# Patient Record
Sex: FEMALE | Race: BLACK OR AFRICAN AMERICAN | Employment: FULL TIME | URBAN - METROPOLITAN AREA
[De-identification: names, ages, dates, MRNs, and addresses within clinical notes are randomized per-mention and may not be internally consistent; named-entity substitution may affect disease eponyms.]

---

## 2017-02-16 ENCOUNTER — OFFICE VISIT (OUTPATIENT)
Dept: FAMILY MEDICINE | Facility: CLINIC | Age: 47
End: 2017-02-16
Payer: COMMERCIAL

## 2017-02-16 VITALS
HEART RATE: 68 BPM | WEIGHT: 163 LBS | DIASTOLIC BLOOD PRESSURE: 58 MMHG | SYSTOLIC BLOOD PRESSURE: 100 MMHG | TEMPERATURE: 98.2 F | RESPIRATION RATE: 16 BRPM | BODY MASS INDEX: 32 KG/M2 | HEIGHT: 60 IN

## 2017-02-16 DIAGNOSIS — E55.9 VITAMIN D DEFICIENCY: ICD-10-CM

## 2017-02-16 DIAGNOSIS — N92.1 METRORRHAGIA: Primary | ICD-10-CM

## 2017-02-16 DIAGNOSIS — R53.82 CHRONIC FATIGUE: ICD-10-CM

## 2017-02-16 DIAGNOSIS — R53.83 FATIGUE, UNSPECIFIED TYPE: ICD-10-CM

## 2017-02-16 DIAGNOSIS — R79.89 LOW TSH LEVEL: ICD-10-CM

## 2017-02-16 LAB
BETA HCG QUAL IFA URINE: NEGATIVE
HGB BLD-MCNC: 13.1 G/DL (ref 11.7–15.7)

## 2017-02-16 PROCEDURE — 80048 BASIC METABOLIC PNL TOTAL CA: CPT | Performed by: PHYSICIAN ASSISTANT

## 2017-02-16 PROCEDURE — 85018 HEMOGLOBIN: CPT | Performed by: PHYSICIAN ASSISTANT

## 2017-02-16 PROCEDURE — 36415 COLL VENOUS BLD VENIPUNCTURE: CPT | Performed by: PHYSICIAN ASSISTANT

## 2017-02-16 PROCEDURE — 84443 ASSAY THYROID STIM HORMONE: CPT | Performed by: PHYSICIAN ASSISTANT

## 2017-02-16 PROCEDURE — 82306 VITAMIN D 25 HYDROXY: CPT | Performed by: PHYSICIAN ASSISTANT

## 2017-02-16 PROCEDURE — 84703 CHORIONIC GONADOTROPIN ASSAY: CPT | Performed by: PHYSICIAN ASSISTANT

## 2017-02-16 PROCEDURE — 99213 OFFICE O/P EST LOW 20 MIN: CPT | Performed by: PHYSICIAN ASSISTANT

## 2017-02-16 RX ORDER — LANOLIN ALCOHOL/MO/W.PET/CERES
1000 CREAM (GRAM) TOPICAL DAILY
COMMUNITY
End: 2018-01-16

## 2017-02-16 RX ORDER — MULTIPLE VITAMINS W/ MINERALS TAB 9MG-400MCG
1 TAB ORAL DAILY
COMMUNITY

## 2017-02-16 NOTE — PROGRESS NOTES
SUBJECTIVE:                                                    Taya Baltazar is a 46 year old female who presents to clinic today for the following health issues:      Concern - fatigue and rule out pregnancy     Onset: 1-2 months    Description:   Works night shift-    Intensity: moderate    Progression of Symptoms:  same    Accompanying Signs & Symptoms:  LMP - 12/25/16 -states had spotting on 2/14/17. Is sexually active does not think she is pregnant       Previous history of similar problem:   yes    Precipitating factors:   Worsened by:     Alleviating factors:  Improved by:        Therapies Tried and outcome:     Patient is here to talk about ongoing fatigue. This has been present for years.   She works alternating night shifts and sleeps about 3 hours and then another 3 hours later, very interrupted sleep. She has 3 children she is caring for when not at work. She had labs done last spring that noted vitamin D deficiency. TSH was low, with normal T4. She just started taking vit d 500 IU last week. Taking vit B complex.   She denies symptoms of depression and states she is very happy.   Does not have trouble sleeping. Denies other symptoms such as pain, headache, vision changes.   She would like a pregnancy test run, just in case.   -------------------------------------    Problem list and histories reviewed & adjusted, as indicated.  Additional history: as documented    Patient Active Problem List   Diagnosis     CARDIOVASCULAR SCREENING; LDL GOAL LESS THAN 160     Vitamin D deficiency     Trigger finger     Chronic low back pain     Indication for care in labor or delivery     Pregnant     Carpal tunnel syndrome     Aphthous ulcer     Xerosis of skin     Pain in joint involving pelvic region and thigh, unspecified laterality     Chronic fatigue     Low TSH level     Past Surgical History   Procedure Laterality Date     No history of surgery         Social History   Substance Use Topics     Smoking status:  Never Smoker     Smokeless tobacco: Never Used     Alcohol use No     Family History   Problem Relation Age of Onset     Unknown/Adopted Father      NOT adopted, but he  young     Asthma No family hx of      C.A.D. No family hx of      DIABETES No family hx of      Hypertension No family hx of      Breast Cancer No family hx of      Cancer - colorectal No family hx of      Anesthesia Reaction No family hx of      Blood Disease No family hx of      Eye Disorder No family hx of      Thyroid Disease No family hx of          Current Outpatient Prescriptions   Medication Sig Dispense Refill     multivitamin, therapeutic with minerals (MULTI-VITAMIN) TABS tablet Take 1 tablet by mouth daily       VITAMIN D, CHOLECALCIFEROL, PO Take 5,000 Units by mouth daily       cyanocobalamin (VITAMIN  B-12) 1000 MCG tablet Take 1,000 mcg by mouth daily       Prenatal Vit-Fe Fumarate-FA (PRENATAL ONE DAILY PO) Take  by mouth.       Allergies   Allergen Reactions     Ibuprofen Sodium Nausea and Vomiting     Vicodin [Hydrocodone-Acetaminophen]      Labs reviewed in EPIC  Problem list, Medication list, Allergies, and Medical/Social/Surgical histories reviewed in [facility] and updated as appropriate.    Social History     Social History     Marital status:      Spouse name: N/A     Number of children: N/A     Years of education: N/A     Social History Main Topics     Smoking status: Never Smoker     Smokeless tobacco: Never Used     Alcohol use No     Drug use: No     Sexual activity: Yes     Partners: Male     Other Topics Concern     Blood Transfusions No     Seat Belt Yes     Social History Narrative       10 point review of systems negative other than symptoms noted above.   Constitutional, HEENT, CV, pulmonary, GI, , MS, Endo, Psych systems are all negative, except as otherwise noted.       OBJECTIVE:  /58 (BP Location: Right arm, Patient Position: Chair, Cuff Size: Adult Regular)  Pulse 68  Temp 98.2  F (36.8  C)  Resp  16  Ht 5' (1.524 m)  Wt 163 lb (73.9 kg)  LMP 12/25/2016 (Approximate)  BMI 31.83 kg/m2  CONSTITUTIONAL: Alert, well-nourished, well-groomed, NAD  RESP: Lungs CTA. No wheeze, rhonchi, rales. Normal effort on room air. Equal lung sounds bilaterally.   CV: HRRR, normal S1, S2. No MRG. No peripheral edema.  DERM: No rashes or suspicious lesions  PSYCH: Alert and oriented. Thought process is clear and goal-directed. Adequate insight and judgement. Mood - normal. Affect - normal.  Neck: No lymphadenopathy or masses. Thyroid smooth, non-tender, and non-enlarged.      Diagnostic Tests:  Results for orders placed or performed in visit on 02/16/17   Beta HCG qual IFA urine   Result Value Ref Range    Beta HCG Qual IFA Urine Negative NEG   Hemoglobin   Result Value Ref Range    Hemoglobin 13.1 11.7 - 15.7 g/dL         ASSESSMENT/PLAN:  (N92.1) Metrorrhagia  (primary encounter diagnosis)  Comment: negative.   Plan: Beta HCG qual IFA urine            (R53.83) Fatigue, unspecified type  Comment: Will recheck labs per patient request, but I suspect this is due to her poor sleep schedule. She says there is no way around this as she needs to work and take care of her kids. Continue new vit D and will recheck levels - could consider 50,000 IU weekly. Increase water intake.   Plan: Basic metabolic panel  (Ca, Cl, CO2, Creat,         Gluc, K, Na, BUN), Vitamin D Deficiency, TSH         with free T4 reflex, Hemoglobin            (E55.9) Vitamin D deficiency  Comment:   Plan: Await labs.     (R53.82) Chronic fatigue  Comment:   Plan: As above.     (R94.6) Low TSH level  Comment:   Plan: recheck labs.     Declines flu shot.   FOLLOW-UP: Await labs. PRN.   The patient agrees with this assessment and plan and agrees to call or return to the clinic with any questions or concerns or if their condition worsens.    BAR Cruz, PAYokoC  [facility]

## 2017-02-16 NOTE — NURSING NOTE
Chief Complaint   Patient presents with     Fatigue       Initial /58 (BP Location: Right arm, Patient Position: Chair, Cuff Size: Adult Regular)  Pulse 68  Temp 98.2  F (36.8  C)  Resp 16  Ht 5' (1.524 m)  Wt 163 lb (73.9 kg)  LMP 12/25/2016 (Approximate)  BMI 31.83 kg/m2 Estimated body mass index is 31.83 kg/(m^2) as calculated from the following:    Height as of this encounter: 5' (1.524 m).    Weight as of this encounter: 163 lb (73.9 kg).  Medication Reconciliation: complete. CARMEN Grider LPN

## 2017-02-16 NOTE — LETTER
[address] [place] [address]   [phone]      February 17, 2017    Taya Baltazar  [id number] [facility] [place] [address]            Dear Ms. Baltazar          Vitamin d still low - sent over prescription for vit d 50,000 IU WEEKLY for 8 weeks then switch to other prescription for 2,000 IU daily.   Thyroid normal, all other labs normal.     BAR Pandya, PA-C

## 2017-02-16 NOTE — MR AVS SNAPSHOT
After Visit Summary   2/16/2017    Taya Baltazar    MRN: [id number]           Patient Information     Date Of Birth          1970        Visit Information        Provider Department      2/16/2017 11:15 AM Kavita Biswas PA-C; [employer] [facility] [place]        Today's Diagnoses     Metrorrhagia    -  1    Fatigue, unspecified type        Vitamin D deficiency        Chronic fatigue        Low TSH level           Follow-ups after your visit        Follow-up notes from your care team     Return if symptoms worsen or fail to improve.      Who to contact     If you have questions or need follow up information about today's clinic visit or your schedule please contact [facility] directly at [phone].  Normal or non-critical lab and imaging results will be communicated to you by [employer], letter or phone within 4 business days after the clinic has received the results. If you do not hear from us within 7 days, please contact the clinic through [employer] or phone. If you have a critical or abnormal lab result, we will notify you by phone as soon as possible.  Submit refill requests through [employer] or call your pharmacy and they will forward the refill request to us. Please allow 3 business days for your refill to be completed.          Additional Information About Your Visit        MyChart Information     [employer] gives you secure access to your electronic health record. If you see a primary care provider, you can also send messages to your care team and make appointments. If you have questions, please call your primary care clinic.  If you do not have a primary care provider, please call [phone] and they will assist you.        Care EveryWhere ID     This is your Care EveryWhere ID. This could be used by other organizations to access your [place] medical records  [id number]        Your Vitals Were     Pulse Temperature Respirations  Height Last Period BMI (Body Mass Index)    68 98.2  F (36.8  C) 16 5' (1.524 m) 12/25/2016 (Approximate) 31.83 kg/m2       Blood Pressure from Last 3 Encounters:   02/16/17 100/58   02/26/16 120/82   11/12/14 106/69    Weight from Last 3 Encounters:   02/16/17 163 lb (73.9 kg)   02/26/16 152 lb (68.9 kg)   11/12/14 164 lb (74.4 kg)              We Performed the Following     Basic metabolic panel  (Ca, Cl, CO2, Creat, Gluc, K, Na, BUN)     Beta HCG qual IFA urine     Hemoglobin     TSH with free T4 reflex     Vitamin D Deficiency        Primary Care Provider Office Phone # Fax #    Kavita Biswas PA-C [phone]       [facility] [address] [place] [address] [address]  [place] [address]        Thank you!     Thank you for choosing [facility]  for your [place]. Our goal is always to provide you with excellent care. Hearing back from our patients is one way we can continue to improve our services. Please take a few minutes to complete the written survey that you may receive in the mail after your visit with us. Thank you!             Your Updated Medication List - Protect others around you: Learn how to safely use, store and throw away your medicines at www.disposemymeds.org.          This list is accurate as of: 2/16/17 12:46 PM.  Always use your most recent med list.                   Brand Name Dispense Instructions for use    cyanocobalamin 1000 MCG tablet    vitamin  B-12     Take 1,000 mcg by mouth daily       Multi-vitamin Tabs tablet      Take 1 tablet by mouth daily       PRENATAL ONE DAILY PO      Take  by mouth.       VITAMIN D (CHOLECALCIFEROL) PO      Take 5,000 Units by mouth daily

## 2017-02-17 LAB
ANION GAP SERPL CALCULATED.3IONS-SCNC: 12 MMOL/L (ref 3–14)
BUN SERPL-MCNC: 14 MG/DL (ref 7–30)
CALCIUM SERPL-MCNC: 8.9 MG/DL (ref 8.5–10.1)
CHLORIDE SERPL-SCNC: 103 MMOL/L (ref 94–109)
CO2 SERPL-SCNC: 25 MMOL/L (ref 20–32)
CREAT SERPL-MCNC: 0.46 MG/DL (ref 0.52–1.04)
DEPRECATED CALCIDIOL+CALCIFEROL SERPL-MC: 29 UG/L (ref 20–75)
GFR SERPL CREATININE-BSD FRML MDRD: ABNORMAL ML/MIN/1.7M2
GLUCOSE SERPL-MCNC: 102 MG/DL (ref 70–99)
POTASSIUM SERPL-SCNC: 4.1 MMOL/L (ref 3.4–5.3)
SODIUM SERPL-SCNC: 140 MMOL/L (ref 133–144)
TSH SERPL DL<=0.005 MIU/L-ACNC: 0.53 MU/L (ref 0.4–4)

## 2017-02-17 RX ORDER — CHOLECALCIFEROL (VITAMIN D3) 50 MCG
2000 TABLET ORAL DAILY
Qty: 90 TABLET | Refills: 3 | Status: SHIPPED | OUTPATIENT
Start: 2017-02-17 | End: 2018-01-16

## 2017-10-01 ENCOUNTER — HEALTH MAINTENANCE LETTER (OUTPATIENT)
Age: 47
End: 2017-10-01

## 2017-11-14 ENCOUNTER — OFFICE VISIT (OUTPATIENT)
Dept: FAMILY MEDICINE | Facility: CLINIC | Age: 47
End: 2017-11-14
Payer: COMMERCIAL

## 2017-11-14 VITALS
HEART RATE: 76 BPM | SYSTOLIC BLOOD PRESSURE: 90 MMHG | BODY MASS INDEX: 31.83 KG/M2 | DIASTOLIC BLOOD PRESSURE: 54 MMHG | TEMPERATURE: 98.7 F | WEIGHT: 163 LBS

## 2017-11-14 DIAGNOSIS — L85.3 DRY SKIN DERMATITIS: ICD-10-CM

## 2017-11-14 DIAGNOSIS — H57.9 ITCH OF EYE: Primary | ICD-10-CM

## 2017-11-14 PROCEDURE — 99213 OFFICE O/P EST LOW 20 MIN: CPT | Performed by: FAMILY MEDICINE

## 2017-11-14 RX ORDER — DESONIDE 0.5 MG/G
OINTMENT TOPICAL
Qty: 15 G | Refills: 0 | Status: SHIPPED | OUTPATIENT
Start: 2017-11-14 | End: 2018-01-16

## 2017-11-14 NOTE — PROGRESS NOTES
SUBJECTIVE:   Taya Baltazar is a 47 year old female who presents to clinic today for the following health issues:      Eye(s) Problem  Onset: 3 days - worsening  Patient noticed some dryness on the upper part of the eyelid on the skin area. She denies any blurry vision no redness no watering of eyes or any other symptoms. She also have similar symptom at the base of her chin on the skin.    Description:   Location: right  Pain: YES  Redness: YES    Accompanying Signs & Symptoms:  Discharge/mattering: no   Swelling: no  Visual changes: no   Fever: no   Nasal Congestion: no   Bothered by bright lights: no       Therapies Tried and outcome: NONE          Problem list and histories reviewed & adjusted, as indicated.  Additional history: as documented    Patient Active Problem List   Diagnosis     CARDIOVASCULAR SCREENING; LDL GOAL LESS THAN 160     Vitamin D deficiency     Trigger finger     Chronic low back pain     Indication for care in labor or delivery     Pregnant     Carpal tunnel syndrome     Aphthous ulcer     Xerosis of skin     Pain in joint involving pelvic region and thigh, unspecified laterality     Chronic fatigue     Low TSH level     Past Surgical History:   Procedure Laterality Date     NO HISTORY OF SURGERY         Social History   Substance Use Topics     Smoking status: Never Smoker     Smokeless tobacco: Never Used     Alcohol use No     Family History   Problem Relation Age of Onset     Unknown/Adopted Father      NOT adopted, but he  young     Asthma No family hx of      C.A.D. No family hx of      DIABETES No family hx of      Hypertension No family hx of      Breast Cancer No family hx of      Cancer - colorectal No family hx of      Anesthesia Reaction No family hx of      Blood Disease No family hx of      Eye Disorder No family hx of      Thyroid Disease No family hx of          Current Outpatient Prescriptions   Medication Sig Dispense Refill     desonide (DESOWEN) 0.05 % ointment  Apply sparingly to affected area 2 times daily as needed. 15 g 0     multivitamin, therapeutic with minerals (MULTI-VITAMIN) TABS tablet Take 1 tablet by mouth daily       VITAMIN D, CHOLECALCIFEROL, PO Take 5,000 Units by mouth daily       cyanocobalamin (VITAMIN  B-12) 1000 MCG tablet Take 1,000 mcg by mouth daily       cholecalciferol (VITAMIN D3) 49915 UNITS capsule Take 1 capsule (50,000 Units) by mouth once a week (Patient not taking: Reported on 11/14/2017) 8 capsule 0     Cholecalciferol (VITAMIN D) 2000 UNITS tablet Take 2,000 Units by mouth daily (Patient not taking: Reported on 11/14/2017) 90 tablet 3     Prenatal Vit-Fe Fumarate-FA (PRENATAL ONE DAILY PO) Take  by mouth.           Reviewed and updated as needed this visit by clinical staffTobacco  Allergies  Meds       Reviewed and updated as needed this visit by Provider         ROS:  C: NEGATIVE for fever, chills, change in weight  ROS otherwise negative    OBJECTIVE:                                                    BP 90/54  Pulse 76  Temp 98.7  F (37.1  C) (Tympanic)  Wt 163 lb (73.9 kg)  LMP 10/20/2017  BMI 31.83 kg/m2  Body mass index is 31.83 kg/(m^2).   GENERAL: healthy, alert, well nourished, well hydrated, no distress  Patient skin on the eyelid is slightly dry ends raised no infection appreciated. On the chin is slightly dry and erythematous      ASSESSMENT/PLAN:                                                        ICD-10-CM    1. Itch of eye skin H57.8 desonide (DESOWEN) 0.05 % ointment   2. Dry skin dermatitis L85.3      Patient has skin irritation possible eczema on the eyelid area along with chin. I would suggest low-dose of steroid cream to be applied 2 times a day for the next 1 week. Follow up if symptoms are not getting better.    Alfredo Belcher MD  [facility]

## 2017-11-14 NOTE — NURSING NOTE
Chief Complaint   Patient presents with     Eye Problem     right        Initial BP 90/54  Pulse 76  Temp 98.7  F (37.1  C) (Tympanic)  Wt 163 lb (73.9 kg)  LMP 10/20/2017  BMI 31.83 kg/m2 Estimated body mass index is 31.83 kg/(m^2) as calculated from the following:    Height as of 2/16/17: 5' (1.524 m).    Weight as of this encounter: 163 lb (73.9 kg).  Medication Reconciliation: complete    Current Outpatient Prescriptions   Medication Sig Dispense Refill     multivitamin, therapeutic with minerals (MULTI-VITAMIN) TABS tablet Take 1 tablet by mouth daily       VITAMIN D, CHOLECALCIFEROL, PO Take 5,000 Units by mouth daily       cyanocobalamin (VITAMIN  B-12) 1000 MCG tablet Take 1,000 mcg by mouth daily       cholecalciferol (VITAMIN D3) 69792 UNITS capsule Take 1 capsule (50,000 Units) by mouth once a week (Patient not taking: Reported on 11/14/2017) 8 capsule 0     Cholecalciferol (VITAMIN D) 2000 UNITS tablet Take 2,000 Units by mouth daily (Patient not taking: Reported on 11/14/2017) 90 tablet 3     Prenatal Vit-Fe Fumarate-FA (PRENATAL ONE DAILY PO) Take  by mouth.         Steven JORGE CMA

## 2017-11-14 NOTE — MR AVS SNAPSHOT
After Visit Summary   11/14/2017    Taya Baltazar    MRN: [id number]           Patient Information     Date Of Birth          1970        Visit Information        Provider Department      11/14/2017 1:00 PM Alfredo Belcher MD; [employer] [facility] [place]        Today's Diagnoses     Itch of eye skin    -  1    Dry skin dermatitis           Follow-ups after your visit        Who to contact     If you have questions or need follow up information about today's clinic visit or your schedule please contact [facility] [place] directly at [phone].  Normal or non-critical lab and imaging results will be communicated to you by [employer], letter or phone within 4 business days after the clinic has received the results. If you do not hear from us within 7 days, please contact the clinic through [employer] or phone. If you have a critical or abnormal lab result, we will notify you by phone as soon as possible.  Submit refill requests through [employer] or call your pharmacy and they will forward the refill request to us. Please allow 3 business days for your refill to be completed.          Additional Information About Your Visit        MyChart Information     [employer] gives you secure access to your electronic health record. If you see a primary care provider, you can also send messages to your care team and make appointments. If you have questions, please call your primary care clinic.  If you do not have a primary care provider, please call [phone] and they will assist you.        Care EveryWhere ID     This is your Care EveryWhere ID. This could be used by other organizations to access your [place] medical records  [id number]        Your Vitals Were     Pulse Temperature Last Period BMI (Body Mass Index)          76 98.7  F (37.1  C) (Tympanic) 10/20/2017 31.83 kg/m2         Blood Pressure from Last 3 Encounters:   11/14/17 90/54   02/16/17 100/58   02/26/16  120/82    Weight from Last 3 Encounters:   11/14/17 163 lb (73.9 kg)   02/16/17 163 lb (73.9 kg)   02/26/16 152 lb (68.9 kg)              Today, you had the following     No orders found for display         Today's Medication Changes          These changes are accurate as of: 11/14/17  1:38 PM.  If you have any questions, ask your nurse or doctor.               Start taking these medicines.        Dose/Directions    desonide 0.05 % ointment   Commonly known as:  DESOWEN   Used for:  Itch of eye   Started by:  Alfredo Belcher MD        Apply sparingly to affected area 2 times daily as needed.   Quantity:  15 g   Refills:  0            Where to get your medicines      These medications were sent to [id number] - [place], [place] - [address] AT [facility] [place] [address]  [address], [place] [address]    Hours:  24-hours Phone:  [phone]     desonide 0.05 % ointment                Primary Care Provider Office Phone # Fax #    Kavita Biswas PA-C [phone]       [address]  [place] [address]        Equal Access to Services     LEILANI QUIROZ AH: Hadii sherif ku hadasho Soomaali, waaxda luqadaha, qaybta kaalmada adeegyada, waxay jocelynnin haysilverio herr. So [facility] [phone].    ATENCIÓN: Si habla español, tiene a quiros disposición servicios gratuitos de asistencia lingüística. Llame al [phone].    We comply with applicable federal civil rights laws and [place] laws. We do not discriminate on the basis of race, color, national origin, age, disability, sex, sexual orientation, or gender identity.            Thank you!     Thank you for choosing [facility] [place]  for your care. Our goal is always to provide you with excellent care. Hearing back from our patients is one way we can continue to improve our services. Please take a few minutes to complete the written survey that you may receive in the mail after your visit with  us. Thank you!             Your Updated Medication List - Protect others around you: Learn how to safely use, store and throw away your medicines at www.disposemymeds.org.          This list is accurate as of: 11/14/17  1:38 PM.  Always use your most recent med list.                   Brand Name Dispense Instructions for use Diagnosis    cyanocobalamin 1000 MCG tablet    vitamin  B-12     Take 1,000 mcg by mouth daily        desonide 0.05 % ointment    DESOWEN    15 g    Apply sparingly to affected area 2 times daily as needed.    Itch of eye       Multi-vitamin Tabs tablet      Take 1 tablet by mouth daily        PRENATAL ONE DAILY PO      Take  by mouth.        * VITAMIN D (CHOLECALCIFEROL) PO      Take 5,000 Units by mouth daily        * cholecalciferol 58875 UNITS capsule    VITAMIN D3    8 capsule    Take 1 capsule (50,000 Units) by mouth once a week    Vitamin D deficiency       * vitamin D 2000 UNITS tablet     90 tablet    Take 2,000 Units by mouth daily    Vitamin D deficiency       * Notice:  This list has 3 medication(s) that are the same as other medications prescribed for you. Read the directions carefully, and ask your doctor or other care provider to review them with you.

## 2018-01-16 ENCOUNTER — OFFICE VISIT (OUTPATIENT)
Dept: FAMILY MEDICINE | Facility: CLINIC | Age: 48
End: 2018-01-16
Payer: COMMERCIAL

## 2018-01-16 VITALS
DIASTOLIC BLOOD PRESSURE: 62 MMHG | WEIGHT: 161 LBS | HEIGHT: 60 IN | SYSTOLIC BLOOD PRESSURE: 90 MMHG | TEMPERATURE: 97.6 F | HEART RATE: 72 BPM | OXYGEN SATURATION: 98 % | BODY MASS INDEX: 31.61 KG/M2

## 2018-01-16 DIAGNOSIS — H65.00 ACUTE SEROUS OTITIS MEDIA, RECURRENCE NOT SPECIFIED, UNSPECIFIED LATERALITY: ICD-10-CM

## 2018-01-16 DIAGNOSIS — J30.0 CHRONIC VASOMOTOR RHINITIS: Primary | ICD-10-CM

## 2018-01-16 PROCEDURE — 99213 OFFICE O/P EST LOW 20 MIN: CPT | Performed by: FAMILY MEDICINE

## 2018-01-16 RX ORDER — FLUTICASONE PROPIONATE 50 MCG
2 SPRAY, SUSPENSION (ML) NASAL DAILY
Qty: 1 BOTTLE | Refills: 3 | Status: SHIPPED | OUTPATIENT
Start: 2018-01-16 | End: 2021-12-15

## 2018-01-16 RX ORDER — CETIRIZINE HYDROCHLORIDE 10 MG/1
10 TABLET ORAL EVERY EVENING
Qty: 90 TABLET | Refills: 1 | Status: SHIPPED | OUTPATIENT
Start: 2018-01-16 | End: 2018-01-24

## 2018-01-16 NOTE — MR AVS SNAPSHOT
After Visit Summary   1/16/2018    Taya Baltazar    MRN: [id number]           Patient Information     Date Of Birth          1970        Visit Information        Provider Department      1/16/2018 1:15 PM Robert Simpson MD; [facility] [employer] [facility] [place]        Today's Diagnoses     Chronic vasomotor rhinitis    -  1    Acute serous otitis media, recurrence not specified, unspecified laterality           Follow-ups after your visit        Follow-up notes from your care team     Return in about 1 month (around 2/16/2018) for Annual Physical Exam.      Who to contact     If you have questions or need follow up information about today's clinic visit or your schedule please [place] [facility] [place] at [phone].  Normal or non-critical lab and imaging results will be communicated to you by MyChart, letter or phone within 4 business days after the clinic has received the results. If you do not hear from us within 7 days, please contact the clinic through MyChart or phone. If you have a critical or abnormal lab result, we will notify you by phone as soon as possible.  Submit refill requests through [employer] or call your pharmacy and they will forward the refill request to us. Please allow 3 business days for your refill to be completed.          Additional Information About Your Visit        MyChart Information     Worldrat gives you secure access to your electronic health record. If you see a primary care provider, you can also send messages to your care team and make appointments. If you have questions, please call your primary care clinic.  If you do not have a primary care provider, please call [phone] and they will assist you.        Care EveryWhere ID     This is your Care EveryWhere ID. This could be used by other organizations to access your [facility] medical records  [id number]        Your Vitals Were     Pulse Temperature Height Pulse  Oximetry BMI (Body Mass Index)       72 97.6  F (36.4  C) (Tympanic) 5' (1.524 m) 98% 31.44 kg/m2        Blood Pressure from Last 3 Encounters:   01/16/18 90/62   11/14/17 90/54   02/16/17 100/58    Weight from Last 3 Encounters:   01/16/18 161 lb (73 kg)   11/14/17 163 lb (73.9 kg)   02/16/17 163 lb (73.9 kg)              Today, you had the following     No orders found for display         Today's Medication Changes          These changes are accurate as of: 1/16/18  1:48 PM.  If you have any questions, ask your nurse or doctor.               Start taking these medicines.        Dose/Directions    cetirizine 10 MG tablet   Commonly known as:  zyrTEC   Used for:  Chronic vasomotor rhinitis, Acute serous otitis media, recurrence not specified, unspecified laterality   Started by:  Robert Simpson MD        Dose:  10 mg   Take 1 tablet (10 mg) by mouth every evening   Quantity:  90 tablet   Refills:  1       fluticasone 50 MCG/ACT spray   Commonly known as:  FLONASE   Used for:  Chronic vasomotor rhinitis   Started by:  Robert Simpson MD        Dose:  2 spray   Spray 2 sprays into both nostrils daily   Quantity:  1 Bottle   Refills:  3         Stop taking these medicines if you haven't already. Please contact your care team if you have questions.     cholecalciferol 08159 UNITS capsule   Commonly known as:  VITAMIN D3   Stopped by:  Robert Simpson MD           cyanocobalamin 1000 MCG tablet   Commonly known as:  vitamin  B-12   Stopped by:  Robert Simpson MD           desonide 0.05 % ointment   Commonly known as:  DESOWEN   Stopped by:  Robert Simpson MD           PRENATAL ONE DAILY PO   Stopped by:  Robert Simpson MD           VITAMIN D (CHOLECALCIFEROL) PO   Stopped by:  Robert Simpson MD           vitamin D 2000 UNITS tablet   Stopped by:  Robert Simpson MD                Where to get your medicines      These medications were sent to [email or web address]  [place], [place] - [address] AT [facility] [place] [address]  [address] [place] [address]     Phone:  [phone]     cetirizine 10 MG tablet    fluticasone 50 MCG/ACT spray                Primary Care Provider Office Phone # Fax #    Robert Simpson [phone]       [address]  [place] [address]        Equal Access to Services     [facility]: Hadii aad ku hadasho Soomaali, waaxda luqadaha, qaybta kaalmada adeegyada, waxay idiin hayaan adeeg kharash la'aan ah. So [facility] [phone].    ATENCIÓN: Si alberto griffiths, tiene a quiros disposición servicios gratuitos de asistencia lingüística. Llame al [phone].    We comply with applicable federal civil rights laws and [place] laws. We do not discriminate on the basis of race, color, national origin, age, disability, sex, sexual orientation, or gender identity.            Thank you!     [facility] you [facility]. Our goal is always to provide you with excellent care. Hearing back from our patients is one way we can continue to improve our services. Please take a few minutes to complete the written survey that you may receive in the mail after your visit with us. Thank you!             Your Updated Medication List - Protect others around you: Learn how to safely use, store and throw away your medicines at www.disposemymeds.org.          This list is accurate as of: 1/16/18  1:48 PM.  Always use your most recent med list.                   Brand Name Dispense Instructions for use Diagnosis    cetirizine 10 MG tablet    zyrTEC    90 tablet    Take 1 tablet (10 mg) by mouth every evening    Chronic vasomotor rhinitis, Acute serous otitis media, recurrence not specified, unspecified laterality       fluticasone 50 MCG/ACT spray    FLONASE    1 Bottle    Spray 2 sprays into both nostrils daily    Chronic vasomotor rhinitis       Multi-vitamin Tabs tablet      Take 1 tablet by mouth daily

## 2018-01-16 NOTE — NURSING NOTE
Chief Complaint   Patient presents with     Nasal Congestion       Initial BP 90/62  Pulse 72  Temp 97.6  F (36.4  C) (Tympanic)  Ht 5' (1.524 m)  Wt 161 lb (73 kg)  SpO2 98%  BMI 31.44 kg/m2 Estimated body mass index is 31.44 kg/(m^2) as calculated from the following:    Height as of this encounter: 5' (1.524 m).    Weight as of this encounter: 161 lb (73 kg).  Medication Reconciliation: complete

## 2018-01-16 NOTE — PROGRESS NOTES
SUBJECTIVE:   Taya Baltazar is a 47 year old female who presents to clinic today for the following health issues:      Acute Illness   Acute illness concerns: nasal congestion  - mucus gets stuck in throat  Onset: 3-4  months    Fever: no     Chills/Sweats: no    Headache (location?): no     Sinus Pressure:no    Conjunctivitis:  no    Ear Pain: YES: both    Rhinorrhea: no     Congestion: YES    Sore Throat: no      Cough: no    Wheeze: no    Decreased Appetite: no    Nausea: no    Vomiting: no    Diarrhea:  no    Dysuria/Freq.: no    Fatigue/Achiness: no    Sick/Strep Exposure: no     Therapies Tried and outcome: no        Problem list and histories reviewed & adjusted, as indicated.  Additional history: as documented    Patient Active Problem List   Diagnosis     CARDIOVASCULAR SCREENING; LDL GOAL LESS THAN 160     Vitamin D deficiency     Trigger finger     Chronic low back pain     Indication for care in labor or delivery     Pregnant     Carpal tunnel syndrome     Aphthous ulcer     Xerosis of skin     Pain in joint involving pelvic region and thigh, unspecified laterality     Chronic fatigue     Low TSH level     Past Surgical History:   Procedure Laterality Date     NO HISTORY OF SURGERY         Social History   Substance Use Topics     Smoking status: Never Smoker     Smokeless tobacco: Never Used     Alcohol use No     Family History   Problem Relation Age of Onset     Unknown/Adopted Father      NOT adopted, but he  young     Asthma No family hx of      C.A.D. No family hx of      DIABETES No family hx of      Hypertension No family hx of      Breast Cancer No family hx of      Cancer - colorectal No family hx of      Anesthesia Reaction No family hx of      Blood Disease No family hx of      Eye Disorder No family hx of      Thyroid Disease No family hx of          Current Outpatient Prescriptions   Medication Sig Dispense Refill     fluticasone (FLONASE) 50 MCG/ACT spray Spray 2 sprays into both  nostrils daily 1 Bottle 3     cetirizine (ZYRTEC) 10 MG tablet Take 1 tablet (10 mg) by mouth every evening 90 tablet 1     multivitamin, therapeutic with minerals (MULTI-VITAMIN) TABS tablet Take 1 tablet by mouth daily       Allergies   Allergen Reactions     Ibuprofen Sodium Nausea and Vomiting     Vicodin [Hydrocodone-Acetaminophen]          Reviewed and updated as needed this visit by clinical staffTobacco  Allergies  Meds       Reviewed and updated as needed this visit by Provider         ROS:  C: NEGATIVE for fever, chills, change in weight  GI: NEGATIVE for nausea, abdominal pain, heartburn, or change in bowel habits    OBJECTIVE:                                                    BP 90/62  Pulse 72  Temp 97.6  F (36.4  C) (Tympanic)  Ht 5' (1.524 m)  Wt 161 lb (73 kg)  SpO2 98%  BMI 31.44 kg/m2  Body mass index is 31.44 kg/(m^2).   GENERAL: healthy, alert, well nourished, well hydrated, no distress  HENT: ear canals- normal; TMs-bilateral serous fluid noted.  Nose-bilateral swollen turbinates.  Clear discharge.  No sinus tenderness bilaterally.; Mouth- no ulcers, no lesions  NECK: no tenderness, no adenopathy, no asymmetry, no masses, no stiffness; thyroid- normal to palpation  RESP: lungs clear to auscultation - no rales, no rhonchi, no wheezes  CV: regular rates and rhythm, normal S1 S2, no S3 or S4 and no murmur, no click or rub -  ABDOMEN: soft, no tenderness, no  hepatosplenomegaly, no masses, normal bowel sounds         ASSESSMENT/PLAN:                                                        ICD-10-CM    1. Chronic vasomotor rhinitis J30.0 fluticasone (FLONASE) 50 MCG/ACT spray     cetirizine (ZYRTEC) 10 MG tablet   2. Acute serous otitis media, recurrence not specified, unspecified laterality H65.00 cetirizine (ZYRTEC) 10 MG tablet     Signs of bacterial infection.  Recommended to start using Flonase every morning and Zyrtec every evening.  Increase hydration.  No antibiotics indicated.  I do  not see signs of viral infection either.  Symptoms are likely allergic in nature.  If symptoms do not improve in the next few weeks, instructed to follow-up.  Recommended an annual examination.      Robert Simpson MD  [facility] [place]

## 2018-01-24 ENCOUNTER — OFFICE VISIT (OUTPATIENT)
Dept: FAMILY MEDICINE | Facility: CLINIC | Age: 48
End: 2018-01-24
Payer: COMMERCIAL

## 2018-01-24 VITALS
OXYGEN SATURATION: 98 % | HEIGHT: 60 IN | WEIGHT: 166 LBS | SYSTOLIC BLOOD PRESSURE: 98 MMHG | DIASTOLIC BLOOD PRESSURE: 68 MMHG | HEART RATE: 76 BPM | TEMPERATURE: 98.1 F | BODY MASS INDEX: 32.59 KG/M2

## 2018-01-24 DIAGNOSIS — Z00.00 ROUTINE GENERAL MEDICAL EXAMINATION AT A HEALTH CARE FACILITY: Primary | ICD-10-CM

## 2018-01-24 DIAGNOSIS — Z12.31 ENCOUNTER FOR SCREENING MAMMOGRAM FOR BREAST CANCER: ICD-10-CM

## 2018-01-24 DIAGNOSIS — E55.9 VITAMIN D DEFICIENCY: ICD-10-CM

## 2018-01-24 DIAGNOSIS — J30.0 CHRONIC VASOMOTOR RHINITIS: ICD-10-CM

## 2018-01-24 PROBLEM — R53.82 CHRONIC FATIGUE: Status: RESOLVED | Noted: 2017-02-16 | Resolved: 2018-01-24

## 2018-01-24 LAB — HGB BLD-MCNC: 12.7 G/DL (ref 11.7–15.7)

## 2018-01-24 PROCEDURE — 36415 COLL VENOUS BLD VENIPUNCTURE: CPT | Performed by: FAMILY MEDICINE

## 2018-01-24 PROCEDURE — 85018 HEMOGLOBIN: CPT | Performed by: FAMILY MEDICINE

## 2018-01-24 PROCEDURE — 80053 COMPREHEN METABOLIC PANEL: CPT | Performed by: FAMILY MEDICINE

## 2018-01-24 PROCEDURE — 80061 LIPID PANEL: CPT | Performed by: FAMILY MEDICINE

## 2018-01-24 PROCEDURE — 82306 VITAMIN D 25 HYDROXY: CPT | Performed by: FAMILY MEDICINE

## 2018-01-24 PROCEDURE — G0145 SCR C/V CYTO,THINLAYER,RESCR: HCPCS | Performed by: FAMILY MEDICINE

## 2018-01-24 PROCEDURE — 87624 HPV HI-RISK TYP POOLED RSLT: CPT | Performed by: FAMILY MEDICINE

## 2018-01-24 PROCEDURE — 99396 PREV VISIT EST AGE 40-64: CPT | Performed by: FAMILY MEDICINE

## 2018-01-24 PROCEDURE — 84443 ASSAY THYROID STIM HORMONE: CPT | Performed by: FAMILY MEDICINE

## 2018-01-24 NOTE — MR AVS SNAPSHOT
After Visit Summary   1/24/2018    Taya Baltazar    MRN: [id number]           Patient Information     Date Of Birth          1970        Visit Information        Provider Department      1/24/2018 10:15 AM Robert Simpson MD; [facility] [employer] [facility] [place]        Today's Diagnoses     Routine general medical examination at a health care facility    -  1    Chronic vasomotor rhinitis        Encounter for screening mammogram for breast cancer          Care Instructions      Preventive Health Recommendations  Female Ages 40 to 49    Yearly exam:     See your health care provider every year in order to  1. Review health changes.   2. Discuss preventive care.    3. Review your medicines if your doctor prescribed any.      Get a Pap test every three years (unless you have an abnormal result and your provider advises testing more often).      If you get Pap tests with HPV test, you only need to test every 5 years, unless you have an abnormal result. You do not need a Pap test if your uterus was removed (hysterectomy) and you have not had cancer.      You should be tested each year for STDs (sexually transmitted diseases), if you're at risk.       Ask your doctor if you should have a mammogram.      Have a colonoscopy (test for colon cancer) if someone in your family has had colon cancer or polyps before age 50.       Have a cholesterol test every 5 years.       Have a diabetes test (fasting glucose) after age 45. If you are at risk for diabetes, you should have this test every 3 years.    Shots: Get a flu shot each year. Get a tetanus shot every 10 years.     Nutrition:     Eat at least 5 servings of fruits and vegetables each day.    Eat whole-grain bread, whole-wheat pasta and brown rice instead of white grains and rice.    Talk to your provider about Calcium and Vitamin D.     Lifestyle    Exercise at least 150 minutes a week (an average of 30 minutes a day, 5 days a week).  "This will help you control your weight and prevent disease.    Limit alcohol to one drink per day.    No smoking.     Wear sunscreen to prevent skin cancer.    See your dentist every six months for an exam and cleaning.          Follow-ups after your visit        Follow-up notes from your care team     Return in about 1 year (around 2019) for Annual Physical Exam.      Future tests that were ordered for you today     Open Future Orders        Priority Expected Expires Ordered    *MA Screening Digital Bilateral Routine  2019            Who to contact     If you have questions or need follow up information about today's clinic visit or your schedule please contact [facility] [place] directly at [phone].  Normal or non-critical lab and imaging results will be communicated to you by MyChart, letter or phone within 4 business days after the clinic has received the results. If you do not hear from us within 7 days, please contact the clinic through [employer] or phone. If you have a critical or abnormal lab result, we will notify you by phone as soon as possible.  Submit refill requests through [employer] Seen [employer] or call your pharmacy and they will forward the refill request to us. Please allow 3 business days for your refill to be completed.          Additional Information About Your Visit        [employer]oon Information     As Seen on TV lets you send messages to your doctor, view your test results, renew your prescriptions, schedule appointments and more. To sign up, go to [email or web address] . Click on \"Log in\" on the left side of the screen, which will take you to the Welcome page. Then click on \"Sign up Now\" on the right side of the page.     You will be asked to enter the access code listed below, as well as some personal information. Please follow the directions to create your username and password.     Your access code is: [email or web address]  Expires: 2018 10:48 AM     Your access code will  " in 90 days. If you need help or a new code, please call your [facility] clinic or [phone].        Care EveryWhere ID     This is your Care EveryWhere ID. This could be used by other organizations to access your [facility] medical records  [id number]        Your Vitals Were     Pulse Temperature Height Last Period Pulse Oximetry BMI (Body Mass Index)    76 98.1  F (36.7  C) (Tympanic) 5' (1.524 m) 01/07/2018 98% 32.42 kg/m2       Blood Pressure from Last 3 Encounters:   01/24/18 98/68   01/16/18 90/62   11/14/17 90/54    Weight from Last 3 Encounters:   01/24/18 166 lb (75.3 kg)   01/16/18 161 lb (73 kg)   11/14/17 163 lb (73.9 kg)              We Performed the Following     Comprehensive metabolic panel     Hemoglobin     HPV High Risk Types DNA Cervical     Lipid Profile     Pap imaged thin layer screen with HPV - recommended age 30 - 65     TSH with free T4 reflex     Vitamin D Deficiency          Today's Medication Changes          These changes are accurate as of 1/24/18 10:48 AM.  If you have any questions, ask your nurse or doctor.               Stop taking these medicines if you haven't already. Please contact your care team if you have questions.     cetirizine 10 MG tablet   Commonly known as:  zyrTEC   Stopped by:  Robert Simpson MD                    Primary Care Provider Office Phone # Fax #    Robert Simpson [phone]       [address]  [place] [address]        Equal Access to Services     [facility]: Hadii aad ku hadasho Soomaali, waaxda luqadaha, qaybta kaalmada akashegyada, jerrod herr. So [facility] [phone].    ATENCIÓN: Si habla español, tiene a quiros disposición servicios gratuitos de asistencia lingüística. Llame al [phone].    We comply with applicable federal civil rights laws and [place] laws. We do not discriminate on the basis of race, color, national origin, age, disability, sex, sexual orientation, or gender identity.             Thank you!     Thank you for choosing [facility] [place]  for your care. Our goal is always to provide you with excellent care. Hearing back from our patients is one way we can continue to improve our services. Please take a few minutes to complete the written survey that you may receive in the mail after your visit with us. Thank you!             Your Updated Medication List - Protect others around you: Learn how to safely use, store and throw away your medicines at www.disposemymeds.org.          This list is accurate as of 1/24/18 10:48 AM.  Always use your most recent med list.                   Brand Name Dispense Instructions for use Diagnosis    fluticasone 50 MCG/ACT spray    FLONASE    1 Bottle    Spray 2 sprays into both nostrils daily    Chronic vasomotor rhinitis       Multi-vitamin Tabs tablet      Take 1 tablet by mouth daily

## 2018-01-24 NOTE — LETTER
February 4, 2018    Taya Baltazar  [id number] [place] [address]    Dear Taya,  We are happy to inform you that your PAP smear result from 1/24/18 is normal.  We are now able to do a follow up test on PAP smears. The DNA test is for HPV (Human Papilloma Virus). Cervical cancer is closely linked with certain types of HPV. Your result showed no evidence of high risk HPV.  Therefore we recommend you return in 3 years for your next pap smear.  You will still need to return to the clinic every year for an annual exam and other preventive tests.  Please contact the clinic at [phone] with any questions.  Sincerely,    Robert Simpson MD/chelsey

## 2018-01-24 NOTE — NURSING NOTE
Chief Complaint   Patient presents with     Physical     fasting       Initial BP 98/68  Pulse 76  Temp 98.1  F (36.7  C) (Tympanic)  Ht 5' (1.524 m)  Wt 166 lb (75.3 kg)  LMP 01/07/2018  SpO2 98%  BMI 32.42 kg/m2 Estimated body mass index is 32.42 kg/(m^2) as calculated from the following:    Height as of this encounter: 5' (1.524 m).    Weight as of this encounter: 166 lb (75.3 kg).  Medication Reconciliation: complete

## 2018-01-24 NOTE — PROGRESS NOTES
SUBJECTIVE:   CC: Taya Baltazar is an 47 year old woman who presents for preventive health visit.     Healthy Habits:    Do you get at least three servings of calcium containing foods daily (dairy, green leafy vegetables, etc.)? yes    Amount of exercise or daily activities, outside of work: 0 day(s) per week    Problems taking medications regularly No    Medication side effects: No    Have you had an eye exam in the past two years? yes    Do you see a dentist twice per year? yes    Do you have sleep apnea, excessive snoring or daytime drowsiness?no      Needs a refill on her allergy medications.    Today's PHQ-2 Score:   PHQ-2 (  [employer]) 2018   Q1: Little interest or pleasure in doing things 0 0   Q2: Feeling down, depressed or hopeless 0 0   PHQ-2 Score 0 0       Abuse: Current or Past(Physical, Sexual or Emotional)- No  Do you feel safe in your environment - Yes    Social History   Substance Use Topics     Smoking status: Never Smoker     Smokeless tobacco: Never Used     Alcohol use No     If you drink alcohol do you typically have >3 drinks per day or >7 drinks per week? No                     Reviewed orders with patient.  Reviewed health maintenance and updated orders accordingly - Yes  Labs reviewed in EPIC  BP Readings from Last 3 Encounters:   18 98/68   18 90/62   17 90/54    Wt Readings from Last 3 Encounters:   18 166 lb (75.3 kg)   18 161 lb (73 kg)   17 163 lb (73.9 kg)                  Patient Active Problem List   Diagnosis     Vitamin D deficiency     Low TSH level     Past Surgical History:   Procedure Laterality Date     NO HISTORY OF SURGERY         Social History   Substance Use Topics     Smoking status: Never Smoker     Smokeless tobacco: Never Used     Alcohol use No     Family History   Problem Relation Age of Onset     Unknown/Adopted Father      NOT adopted, but he  young     Asthma No family hx of      C.A.D. No family hx of       DIABETES No family hx of      Hypertension No family hx of      Breast Cancer No family hx of      Cancer - colorectal No family hx of      Anesthesia Reaction No family hx of      Blood Disease No family hx of      Eye Disorder No family hx of      Thyroid Disease No family hx of          Current Outpatient Prescriptions   Medication Sig Dispense Refill     fluticasone (FLONASE) 50 MCG/ACT spray Spray 2 sprays into both nostrils daily 1 Bottle 3     multivitamin, therapeutic with minerals (MULTI-VITAMIN) TABS tablet Take 1 tablet by mouth daily       Allergies   Allergen Reactions     Ibuprofen Sodium Nausea and Vomiting     Vicodin [Hydrocodone-Acetaminophen]        Patient under age 50, mutual decision reflected in health maintenance.      Pertinent mammograms are reviewed under the imaging tab.  History of abnormal Pap smear: NO - age 30- 65 PAP every 3 years recommended    Reviewed and updated as needed this visit by clinical staff  Tobacco  Allergies  Meds  Problems         Reviewed and updated as needed this visit by Provider  Allergies  Problems            ROS:  C: NEGATIVE for fever, chills, change in weight  I: NEGATIVE for worrisome rashes, moles or lesions  E: NEGATIVE for vision changes or irritation  ENT: NEGATIVE for ear, mouth and throat problems  R: NEGATIVE for significant cough or SOB  B: NEGATIVE for masses, tenderness or discharge  CV: NEGATIVE for chest pain, palpitations or peripheral edema  GI: NEGATIVE for nausea, abdominal pain, heartburn, or change in bowel habits  : NEGATIVE for unusual urinary or vaginal symptoms. Periods are regular.  M: NEGATIVE for significant arthralgias or myalgia  N: NEGATIVE for weakness, dizziness or paresthesias  P: NEGATIVE for changes in mood or affect    OBJECTIVE:   BP 98/68  Pulse 76  Temp 98.1  F (36.7  C) (Tympanic)  Ht 5' (1.524 m)  Wt 166 lb (75.3 kg)  LMP 01/07/2018  SpO2 98%  BMI 32.42 kg/m2  EXAM:  GENERAL: healthy, alert and no  distress  EYES: Eyes grossly normal to inspection, PERRL and conjunctivae and sclerae normal  HENT: ear canals and TM's normal, nose and mouth without ulcers or lesions  NECK: no adenopathy, no asymmetry, masses, or scars and thyroid normal to palpation  RESP: lungs clear to auscultation - no rales, rhonchi or wheezes  BREAST: normal without masses, tenderness or nipple discharge and no palpable axillary masses or adenopathy  CV: regular rate and rhythm, normal S1 S2, no S3 or S4, no murmur, click or rub, no peripheral edema and peripheral pulses strong  ABDOMEN: soft, nontender, no hepatosplenomegaly, no masses and bowel sounds normal   (female): normal female external genitalia, normal urethral meatus, vaginal mucosa pink, moist, well rugated, and normal cervix/adnexa/uterus without masses or discharge  MS: no gross musculoskeletal defects noted, no edema  SKIN: no suspicious lesions or rashes  NEURO: Normal strength and tone, mentation intact and speech normal  PSYCH: mentation appears normal, affect normal/bright    ASSESSMENT/PLAN:   1. Routine general medical examination at a health care facility  Screening Pap smear and fasting labs ordered.  - Pap imaged thin layer screen with HPV - recommended age 30 - 65  - HPV High Risk Types DNA Cervical  - TSH with free T4 reflex  - Hemoglobin  - Comprehensive metabolic panel  - Lipid Profile  - Vitamin D Deficiency    2. Chronic vasomotor rhinitis  Refill on Flonase was ordered earlier this month.  Patient notified    3. Encounter for screening mammogram for breast cancer  Screening mammogram ordered  - *MA Screening Digital Bilateral; Future    COUNSELING:   Reviewed preventive health counseling, as reflected in patient instructions       Regular exercise       Healthy diet/nutrition         reports that she has never smoked. She has never used smokeless tobacco.    Estimated body mass index is 32.42 kg/(m^2) as calculated from the following:    Height as of this  encounter: 5' (1.524 m).    Weight as of this encounter: 166 lb (75.3 kg).   Weight management plan: Discussed healthy diet and exercise guidelines and patient will follow up in 12 months in clinic to re-evaluate.    Counseling Resources:  ATP IV Guidelines  Pooled Cohorts Equation Calculator  Breast Cancer Risk Calculator  FRAX Risk Assessment  ICSI Preventive Guidelines  Dietary Guidelines for Americans, 2010  USDA's MyPlate  ASA Prophylaxis  Lung CA Screening    Robert Simpson MD  [facility] [place]

## 2018-01-24 NOTE — LETTER
January 26, 2018      Taya Baltazar  [id number] [facility] [place] [address]        Dear ,    I have reviewed your recent labs. Here are the results:    -Liver and gallbladder tests are normal. (ALT,AST, Alk phos, bilirubin), kidney function is normal (Cr, GFR), Sodium is normal, Potassium is normal, Calcium is normal, Glucose is normal (diabetes screening test).     -Your LDL cholesterol is flagged as high. However, this is a cut off only for people with chronic health conditions like diabetes, heart disease. For other individuals the goals for LDL cholesterol are higher, so I am happy with where your LDL is.    ADVICE: The mediterranean diet has been shown to reduce LDL cholesterol and increase HDL cholesterol and has also been shown to reduce the risk of cardiovascular disease, cancer, and Alzheimer's dementia. This diet focuses on healthy fats (monounsatruated and polyunsaturated) such that are found in fish, extra virgin olive oil, nuts, and avocados. It is also high in fruits & vegetables (7 servings per day) and whole grains. Regular exercise is also important in reducing cholesterol. Repeat cholesterol panel should be checked in 1year again.    -TSH (thyroid stimulating hormone) level is normal which indicates normal thyroid function.  -Hemoglobin is normal.  There is no evidence of anemia.    -Vitamin D level is low and oral supplementation should be started.  ADVISE: starting over the counter Vitamin D3  2000 IU daily.  I have ordered the medication to the pharmacy.    Results for orders placed or performed in visit on 01/24/18   TSH with free T4 reflex   Result Value Ref Range    TSH 0.57 0.40 - 4.00 mU/L   Hemoglobin   Result Value Ref Range    Hemoglobin 12.7 11.7 - 15.7 g/dL   Comprehensive metabolic panel   Result Value Ref Range    Sodium 136 133 - 144 mmol/L    Potassium 3.8 3.4 - 5.3 mmol/L    Chloride 104 94 - 109 mmol/L    Carbon Dioxide 22 20 - 32 mmol/L    Anion Gap 10 3 - 14 mmol/L     Glucose 93 70 - 99 mg/dL    Urea Nitrogen 11 7 - 30 mg/dL    Creatinine 0.38 (L) 0.52 - 1.04 mg/dL    GFR Estimate >90 >60 mL/min/1.7m2    GFR Estimate If Black >90 >60 mL/min/1.7m2    Calcium 8.6 8.5 - 10.1 mg/dL    Bilirubin Total 0.3 0.2 - 1.3 mg/dL    Albumin 3.8 3.4 - 5.0 g/dL    Protein Total 7.6 6.8 - 8.8 g/dL    Alkaline Phosphatase 60 40 - 150 U/L    ALT 15 0 - 50 U/L    AST 18 0 - 45 U/L   Lipid Profile   Result Value Ref Range    Cholesterol 195 <200 mg/dL    Triglycerides 90 <150 mg/dL    HDL Cholesterol 48 (L) >49 mg/dL    LDL Cholesterol Calculated 129 (H) <100 mg/dL    Non HDL Cholesterol 147 (H) <130 mg/dL   Vitamin D Deficiency   Result Value Ref Range    Vitamin D Deficiency screening 22 20 - 75 ug/L           If you have any questions or concerns, please call the clinic at the number listed above.       Sincerely,        Robert Simpson MD

## 2018-01-25 LAB
ALBUMIN SERPL-MCNC: 3.8 G/DL (ref 3.4–5)
ALP SERPL-CCNC: 60 U/L (ref 40–150)
ALT SERPL W P-5'-P-CCNC: 15 U/L (ref 0–50)
ANION GAP SERPL CALCULATED.3IONS-SCNC: 10 MMOL/L (ref 3–14)
AST SERPL W P-5'-P-CCNC: 18 U/L (ref 0–45)
BILIRUB SERPL-MCNC: 0.3 MG/DL (ref 0.2–1.3)
BUN SERPL-MCNC: 11 MG/DL (ref 7–30)
CALCIUM SERPL-MCNC: 8.6 MG/DL (ref 8.5–10.1)
CHLORIDE SERPL-SCNC: 104 MMOL/L (ref 94–109)
CHOLEST SERPL-MCNC: 195 MG/DL
CO2 SERPL-SCNC: 22 MMOL/L (ref 20–32)
CREAT SERPL-MCNC: 0.38 MG/DL (ref 0.52–1.04)
DEPRECATED CALCIDIOL+CALCIFEROL SERPL-MC: 22 UG/L (ref 20–75)
GFR SERPL CREATININE-BSD FRML MDRD: >90 ML/MIN/1.7M2
GLUCOSE SERPL-MCNC: 93 MG/DL (ref 70–99)
HDLC SERPL-MCNC: 48 MG/DL
LDLC SERPL CALC-MCNC: 129 MG/DL
NONHDLC SERPL-MCNC: 147 MG/DL
POTASSIUM SERPL-SCNC: 3.8 MMOL/L (ref 3.4–5.3)
PROT SERPL-MCNC: 7.6 G/DL (ref 6.8–8.8)
SODIUM SERPL-SCNC: 136 MMOL/L (ref 133–144)
TRIGL SERPL-MCNC: 90 MG/DL
TSH SERPL DL<=0.005 MIU/L-ACNC: 0.57 MU/L (ref 0.4–4)

## 2018-01-26 LAB
COPATH REPORT: NORMAL
PAP: NORMAL

## 2018-01-26 RX ORDER — CHOLECALCIFEROL (VITAMIN D3) 50 MCG
2000 TABLET ORAL DAILY
Qty: 90 TABLET | Refills: 3 | Status: SHIPPED | OUTPATIENT
Start: 2018-01-26 | End: 2019-04-04

## 2018-01-30 LAB
FINAL DIAGNOSIS: NORMAL
HPV HR 12 DNA CVX QL NAA+PROBE: NEGATIVE
HPV16 DNA SPEC QL NAA+PROBE: NEGATIVE
HPV18 DNA SPEC QL NAA+PROBE: NEGATIVE
SPECIMEN DESCRIPTION: NORMAL
SPECIMEN SOURCE CVX/VAG CYTO: NORMAL

## 2018-08-24 ENCOUNTER — TRANSFERRED RECORDS (OUTPATIENT)
Dept: HEALTH INFORMATION MANAGEMENT | Facility: CLINIC | Age: 48
End: 2018-08-24

## 2018-09-04 ENCOUNTER — OFFICE VISIT (OUTPATIENT)
Dept: FAMILY MEDICINE | Facility: CLINIC | Age: 48
End: 2018-09-04
Payer: COMMERCIAL

## 2018-09-04 VITALS — DIASTOLIC BLOOD PRESSURE: 73 MMHG | HEART RATE: 76 BPM | SYSTOLIC BLOOD PRESSURE: 107 MMHG

## 2018-09-04 DIAGNOSIS — L30.9 ECZEMA, UNSPECIFIED TYPE: Primary | ICD-10-CM

## 2018-09-04 PROCEDURE — 99214 OFFICE O/P EST MOD 30 MIN: CPT | Performed by: FAMILY MEDICINE

## 2018-09-04 RX ORDER — BETAMETHASONE DIPROPIONATE 0.5 MG/G
CREAM TOPICAL 2 TIMES DAILY
Qty: 50 G | Refills: 1 | Status: SHIPPED | OUTPATIENT
Start: 2018-09-04 | End: 2021-12-15

## 2018-09-04 NOTE — PROGRESS NOTES
[facility] - PRIMARY CARE SKIN    CC : itchiness  SUBJECTIVE:   Taya Baltazar is a 48 year old female who presents to clinic today with  because of itchy areas on the lower legs and left forearm. She suspects black bill usage years ago provoking symptoms on the left forearm, although red or brown bill products have not provoked the same irritation. She was treated with triamcinolone 0.1% cream in  for this rash on the left forearm.    Pruritic : YES - occasionally.  Symptoms appear to be : worsening.  Aggravating factors : She has had bill on the forearms. Hot water also provokes itchiness. Itchiness is more prominent in the mornings  Relieving factors : none identified.    Previous similar history : YES - chronic issue.  Recent exposure history : bill   Any other family members with similar symptoms : children have eczema.    Therapies tried :  Oral zyrtec  Products used : O'Keeffe's skin repair cream.    Personal Medical History  Skin Cancer : NO  Eczema Psoriasis Rosacea Autoimmune   YES NO NO NO     Family Medical History  Skin Cancer : NO  Eczema Psoriasis Rosacea Autoimmune   NO NO NO NO     Patient Active Problem List   Diagnosis     Vitamin D deficiency     Low TSH level       Past Medical History:   Diagnosis Date     Active labor 10/4/2012     Aphthous ulcer 10/15/2014     Carpal tunnel syndrome 10/15/2014    Left       Chronic low back pain 2013     Supervision of other normal pregnancy      - vaginal     Trigger finger 2013     Vitamin D deficiency 2011    Past Surgical History:   Procedure Laterality Date     NO HISTORY OF SURGERY        Social History   Substance Use Topics     Smoking status: Never Smoker     Smokeless tobacco: Never Used     Alcohol use No    Family History     Problem (# of Occurrences) Relation (Name,Age of Onset)    Unknown/Adopted (1) Father: NOT adopted, but he  young       Negative family history of: Asthma, C.A.D., Diabetes,  Hypertension, Breast Cancer, Cancer - colorectal, Anesthesia Reaction, Blood Disease, Eye Disorder, Thyroid Disease           Prescription Medications as of 9/4/2018             augmented betamethasone dipropionate (DIPROLENE-AF) 0.05 % cream Apply topically 2 times daily to AA on legs for 10-14 days. Not for use on face nor groin.    Cholecalciferol (VITAMIN D) 2000 UNITS tablet Take 2,000 Units by mouth daily    fluticasone (FLONASE) 50 MCG/ACT spray Spray 2 sprays into both nostrils daily    multivitamin, therapeutic with minerals (MULTI-VITAMIN) TABS tablet Take 1 tablet by mouth daily      Facility Administered Medications as of 9/4/2018             bupivacaine HCl 0.25 % preservative free injection SOLN as needed    fentaNYL (SUBLIMAZE) injection by Intrathecal route as needed for moderate to severe pain            Allergies   Allergen Reactions     Ibuprofen Sodium Nausea and Vomiting     Vicodin [Hydrocodone-Acetaminophen]         INTEGUMENTARY/SKIN: POSITIVE for pruritis and rash    ROS : 14 point review of systems was negative except the symptoms listed above in the HPI.    This document serves as a record of the services and decisions personally performed and made by Venice Avalos MD. It was created on her behalf by Major Quiñones, a trained medical scribe.  The creation of this document is based on the scribe's personal observations and the provider's statements to the medical scribe.  Major Quiñones, September 4, 2018 2:53 PM      OBJECTIVE:   GENERAL: healthy, alert and no distress  SKIN: Gutierrez Skin Type - V.  Face, Arms and Legs were examined. The dermatoscope was used to help evaluate pigmented lesions.  Skin Pertinent Findings:  Left dorsal wrist : residual hyperpigmentation and scar from previous contact dermatitis secondary to bill  Lower legs : no rash at this time    Diagnostic Test Results:  none           ASSESSMENT:     Encounter Diagnosis   Name Primary?     Eczema, unspecified type Yes  "        PLAN:   Patient Instructions   FUTURE APPOINTMENTS  Follow up as needed if symptoms are not improving.    DRY SKIN MANAGEMENT INSTRUCTIONS  Routine use of moisturizer is important for healthy, resilient skin not just for soft skin.     Sealing in moisture    Twice daily use of a moisturizer such as over-the-counter (OTC) CeraVe moisturizer cream (in the jar). CeraVe products contain ceramides and filaggrin proteins that can help to maintain the body's moisture layer.    After cleansing or washing, always apply moisturizer immediately after drying off (pat dry only) for best effect.    Protection while hydrating    Do not overuse soap. Unless you have been sweating extensively, just apply soap to groin and armpits.    Recommended products for body include: OTC unscented Dove for sensitive skin or OTC Vanicream cleansing bar.    Recommended facial cleansers include: OTC CeraVe hydrating facial cleanser or OTC Cetaphil daily facial cleanser.    Always try to wear rubber gloves when washing dishes or cleaning.    Avoid use of    Scented/perfumed products    Irritating clothing (wool, new jeans, new/unwashed clothing, scratchy synthetics)    Neosporin or triple antibiotic topical products    Products containing aloe, herbs, Vitamin E, or other \"natural ingredients\".    Dryer sheets or fabric softeners (while symptoms are present)    If a topical medication is prescribed, apply topical prescription first, followed by use of moisturizing product.    TOPICAL STEROID INSTRUCTIONS - for use on legs  augmented betamethasone dipropionate 0.05% cream.  1. Wash hands before applying topical steroid. Use the adult fingertip unit (FTU) as a guide.    2. Apply sparingly (just enough to rub in) onto affected areas of the legs (not to exceed 1 FTU per area), two times per day for 10-14 days.  3. Then after 2 weeks, use only when needed for itchiness.  4. Wash off any excess, unused topical steroid.    This higher strength " steroid should never be used on face nor groin.    After the initial treatment, topical steroid may be used as needed for flare-ups but only for short-term treatment.    If you are using this for prolonged periods of time to control flare-ups, return to clinic for re-evaluation of treatment.    Keep in mind to also regularly use moisturizer, as this preventative measure can help maintain your skin's natural protective moisture barrier.    The patient was counseled to use products free of fragrance, dyes, and plants. The importance of using bland cleansers and the regular use of heavy bland emollient creams was impressed upon the patient.      PROCEDURES:   None.    TT : 20 minutes.  CT : 15 minutes.      The information in this document, created by the medical scribe for me, accurately reflects the services I personally performed and the decisions made by me. I have reviewed and approved this document for accuracy prior to leaving the patient care area.  Venice Avalos MD September 4, 2018 2:53 PM  [facility] [place]

## 2018-09-04 NOTE — MR AVS SNAPSHOT
"              After Visit Summary   9/4/2018    Taya Baltazar    MRN: [id number]           Patient Information     Date Of Birth          1970        Visit Information        Provider Department      9/4/2018 3:25 PM Carlotta Avalos MD; [employer] [facility] [place]        Today's Diagnoses     Eczema, unspecified type    -  1      Care Instructions    FUTURE APPOINTMENTS  Follow up as needed if symptoms are not improving.    DRY SKIN MANAGEMENT INSTRUCTIONS  Routine use of moisturizer is important for healthy, resilient skin not just for soft skin.     Sealing in moisture    Twice daily use of a moisturizer such as over-the-counter (OTC) CeraVe moisturizer cream (in the jar). CeraVe products contain ceramides and filaggrin proteins that can help to maintain the body's moisture layer.    After cleansing or washing, always apply moisturizer immediately after drying off (pat dry only) for best effect.    Protection while hydrating    Do not overuse soap. Unless you have been sweating extensively, just apply soap to groin and armpits.    Recommended products for body include: OTC unscented Dove for sensitive skin or OTC Vanicream cleansing bar.    Recommended facial cleansers include: OTC CeraVe hydrating facial cleanser or OTC Cetaphil daily facial cleanser.    Always try to wear rubber gloves when washing dishes or cleaning.    Avoid use of    Scented/perfumed products    Irritating clothing (wool, new jeans, new/unwashed clothing, scratchy synthetics)    Neosporin or triple antibiotic topical products    Products containing aloe, herbs, Vitamin E, or other \"natural ingredients\".    Dryer sheets or fabric softeners (while symptoms are present)    If a topical medication is prescribed, apply topical prescription first, followed by use of moisturizing product.    TOPICAL STEROID INSTRUCTIONS - for use on legs  augmented betamethasone dipropionate 0.05% cream.  1. Wash hands " "before applying topical steroid. Use the adult fingertip unit (FTU) as a guide.    2. Apply sparingly (just enough to rub in) onto affected areas of the legs (not to exceed 1 FTU per area), two times per day for 10-14 days.  3. Then after 2 weeks, use only when needed for itchiness.  4. Wash off any excess, unused topical steroid.    This higher strength steroid should never be used on face nor groin.    After the initial treatment, topical steroid may be used as needed for flare-ups but only for short-term treatment.    If you are using this for prolonged periods of time to control flare-ups, return to clinic for re-evaluation of treatment.    Keep in mind to also regularly use moisturizer, as this preventative measure can help maintain your skin's natural protective moisture barrier.          Follow-ups after your visit        Who to contact     If you have questions or need follow up information about today's clinic visit or your schedule please contact [facility] [place] directly at [phone].  Normal or non-critical lab and imaging results will be communicated to you by Wheretogethart, letter or phone within 4 business days after the clinic has received the results. If you do not hear from us within 7 days, please contact the clinic through [employer] or phone. If you have a critical or abnormal lab result, we will notify you by phone as soon as possible.  Submit refill requests through SocialSmack or call your pharmacy and they will forward the refill request to us. Please allow 3 business days for your refill to be completed.          Additional Information About Your Visit        SocialSmack Information     SocialSmack lets you send messages to your doctor, view your test results, renew your prescriptions, schedule appointments and more. To sign up, go to [email or web address] . Click on \"Log in\" on the left side of the screen, which will take you to the Welcome page. Then click on \"Sign up Now\" on the right side " of the page.     You will be asked to enter the access code listed below, as well as some personal information. Please follow the directions to create your username and password.     Your access code is: [email or web address]  Expires: 12/3/2018  3:36 PM     Your access code will  in 90 days. If you need help or a new code, please call your [place] clinic or [phone].        Care EveryWhere ID     This is your Care EveryWhere ID. This could be used by other organizations to access your [place] medical records  [id number]        Your Vitals Were     Pulse                   76            Blood Pressure from Last 3 Encounters:   18 107/73   18 98/68   18 90/62    Weight from Last 3 Encounters:   18 166 lb (75.3 kg)   18 161 lb (73 kg)   17 163 lb (73.9 kg)              Today, you had the following     No orders found for display       Primary Care Provider Office Phone # Fax #    Robert Simpson [phone]       [address]  [place] [address]        Equal Access to Services     [facility]: Hadii aad ku hadasho Soomaali, waaxda luqadaha, qaybta kaalmada adeegyada, waxay yoan dominguez . So [facility] [phone].    ATENCIÓN: Si habla español, tiene a quiros disposición servicios gratuitos de asistencia lingüística. Llame al [phone].    We comply with applicable federal civil rights laws and [place] laws. We do not discriminate on the basis of race, color, national origin, age, disability, sex, sexual orientation, or gender identity.            Thank you!     Thank you for choosing [facility] [place]  for your care. Our goal is always to provide you with excellent care. Hearing back from our patients is one way we can continue to improve our services. Please take a few minutes to complete the written survey that you may receive in the mail after your visit with us. Thank you!             Your Updated Medication List -  Protect others around you: Learn how to safely use, store and throw away your medicines at www.disposemymeds.org.          This list is accurate as of 9/4/18  3:36 PM.  Always use your most recent med list.                   Brand Name Dispense Instructions for use Diagnosis    fluticasone 50 MCG/ACT spray    FLONASE    1 Bottle    Spray 2 sprays into both nostrils daily    Chronic vasomotor rhinitis       Multi-vitamin Tabs tablet      Take 1 tablet by mouth daily        vitamin D 2000 units tablet     90 tablet    Take 2,000 Units by mouth daily    Vitamin D deficiency

## 2018-09-04 NOTE — LETTER
2018         RE: Taya Baltazar  [address]  [place] [address]        Dear Colleague,    Thank you for referring your patient, Taya Baltazar, to the [facility]. Please see a copy of my visit note below.    [facility] - PRIMARY CARE SKIN    CC : itchiness  SUBJECTIVE:   Taya Baltazar is a 48 year old female who presents to clinic today with  because of itchy areas on the lower legs and left forearm. She suspects black bill usage years ago provoking symptoms on the left forearm, although red or brown bill products have not provoked the same irritation. She was treated with triamcinolone 0.1% cream in  for this rash on the left forearm.    Pruritic : YES - occasionally.  Symptoms appear to be : worsening.  Aggravating factors : She has had bill on the forearms. Hot water also provokes itchiness. Itchiness is more prominent in the mornings  Relieving factors : none identified.    Previous similar history : YES - chronic issue.  Recent exposure history : bill   Any other family members with similar symptoms : children have eczema.    Therapies tried :  Oral zyrtec  Products used : O'Keeffe's skin repair cream.    Personal Medical History  Skin Cancer : NO  Eczema Psoriasis Rosacea Autoimmune   YES NO NO NO     Family Medical History  Skin Cancer : NO  Eczema Psoriasis Rosacea Autoimmune   NO NO NO NO     Patient Active Problem List   Diagnosis     Vitamin D deficiency     Low TSH level       Past Medical History:   Diagnosis Date     Active labor 10/4/2012     Aphthous ulcer 10/15/2014     Carpal tunnel syndrome 10/15/2014    Left       Chronic low back pain 2013     Supervision of other normal pregnancy      - vaginal     Trigger finger 2013     Vitamin D deficiency 2011    Past Surgical History:   Procedure Laterality Date     NO HISTORY OF SURGERY        Social History   Substance Use Topics     Smoking status: Never Smoker     Smokeless  tobacco: Never Used     Alcohol use No    Family History     Problem (# of Occurrences) Relation (Name,Age of Onset)    Unknown/Adopted (1) Father: NOT adopted, but he  young       Negative family history of: Asthma, C.A.D., Diabetes, Hypertension, Breast Cancer, Cancer - colorectal, Anesthesia Reaction, Blood Disease, Eye Disorder, Thyroid Disease           Prescription Medications as of 2018             augmented betamethasone dipropionate (DIPROLENE-AF) 0.05 % cream Apply topically 2 times daily to AA on legs for 10-14 days. Not for use on face nor groin.    Cholecalciferol (VITAMIN D) 2000 UNITS tablet Take 2,000 Units by mouth daily    fluticasone (FLONASE) 50 MCG/ACT spray Spray 2 sprays into both nostrils daily    multivitamin, therapeutic with minerals (MULTI-VITAMIN) TABS tablet Take 1 tablet by mouth daily      Facility Administered Medications as of 2018             bupivacaine HCl 0.25 % preservative free injection SOLN as needed    fentaNYL (SUBLIMAZE) injection by Intrathecal route as needed for moderate to severe pain            Allergies   Allergen Reactions     Ibuprofen Sodium Nausea and Vomiting     Vicodin [Hydrocodone-Acetaminophen]         INTEGUMENTARY/SKIN: POSITIVE for pruritis and rash    ROS : 14 point review of systems was negative except the symptoms listed above in the HPI.    This document serves as a record of the services and decisions personally performed and made by Venice Avalos MD. It was created on her behalf by Major Quiñones, a trained medical scribe.  The creation of this document is based on the scribe's personal observations and the provider's statements to the medical scribe.  Major Quiñones, 2018 2:53 PM      OBJECTIVE:   GENERAL: healthy, alert and no distress  SKIN: Gutierrez Skin Type - V.  Face, Arms and Legs were examined. The dermatoscope was used to help evaluate pigmented lesions.  Skin Pertinent Findings:  Left dorsal wrist : residual  "hyperpigmentation and scar from previous contact dermatitis secondary to bill  Lower legs : no rash at this time    Diagnostic Test Results:  none           ASSESSMENT:     Encounter Diagnosis   Name Primary?     Eczema, unspecified type Yes         PLAN:   Patient Instructions   FUTURE APPOINTMENTS  Follow up as needed if symptoms are not improving.    DRY SKIN MANAGEMENT INSTRUCTIONS  Routine use of moisturizer is important for healthy, resilient skin not just for soft skin.     Sealing in moisture    Twice daily use of a moisturizer such as over-the-counter (OTC) CeraVe moisturizer cream (in the jar). CeraVe products contain ceramides and filaggrin proteins that can help to maintain the body's moisture layer.    After cleansing or washing, always apply moisturizer immediately after drying off (pat dry only) for best effect.    Protection while hydrating    Do not overuse soap. Unless you have been sweating extensively, just apply soap to groin and armpits.    Recommended products for body include: OTC unscented Dove for sensitive skin or OTC Vanicream cleansing bar.    Recommended facial cleansers include: OTC CeraVe hydrating facial cleanser or OTC Cetaphil daily facial cleanser.    Always try to wear rubber gloves when washing dishes or cleaning.    Avoid use of    Scented/perfumed products    Irritating clothing (wool, new jeans, new/unwashed clothing, scratchy synthetics)    Neosporin or triple antibiotic topical products    Products containing aloe, herbs, Vitamin E, or other \"natural ingredients\".    Dryer sheets or fabric softeners (while symptoms are present)    If a topical medication is prescribed, apply topical prescription first, followed by use of moisturizing product.    TOPICAL STEROID INSTRUCTIONS - for use on legs  augmented betamethasone dipropionate 0.05% cream.  1. Wash hands before applying topical steroid. Use the adult fingertip unit (FTU) as a guide.    2. Apply sparingly (just enough to " rub in) onto affected areas of the legs (not to exceed 1 FTU per area), two times per day for 10-14 days.  3. Then after 2 weeks, use only when needed for itchiness.  4. Wash off any excess, unused topical steroid.    This higher strength steroid should never be used on face nor groin.    After the initial treatment, topical steroid may be used as needed for flare-ups but only for short-term treatment.    If you are using this for prolonged periods of time to control flare-ups, return to clinic for re-evaluation of treatment.    Keep in mind to also regularly use moisturizer, as this preventative measure can help maintain your skin's natural protective moisture barrier.    The patient was counseled to use products free of fragrance, dyes, and plants. The importance of using bland cleansers and the regular use of heavy bland emollient creams was impressed upon the patient.      PROCEDURES:   None.    TT : 20 minutes.  CT : 15 minutes.      The information in this document, created by the medical scribe for me, accurately reflects the services I personally performed and the decisions made by me. I have reviewed and approved this document for accuracy prior to leaving the patient care area.  Venice Avalos MD September 4, 2018 2:53 PM  [facility]    Again, thank you for allowing me to participate in the care of your patient.        Sincerely,        Carlotta Avalos MD

## 2018-09-04 NOTE — PATIENT INSTRUCTIONS
"FUTURE APPOINTMENTS  Follow up as needed if symptoms are not improving.    DRY SKIN MANAGEMENT INSTRUCTIONS  Routine use of moisturizer is important for healthy, resilient skin not just for soft skin.     Sealing in moisture    Twice daily use of a moisturizer such as over-the-counter (OTC) CeraVe moisturizer cream (in the jar). CeraVe products contain ceramides and filaggrin proteins that can help to maintain the body's moisture layer.    After cleansing or washing, always apply moisturizer immediately after drying off (pat dry only) for best effect.    Protection while hydrating    Do not overuse soap. Unless you have been sweating extensively, just apply soap to groin and armpits.    Recommended products for body include: OTC unscented Dove for sensitive skin or OTC Vanicream cleansing bar.    Recommended facial cleansers include: OTC CeraVe hydrating facial cleanser or OTC Cetaphil daily facial cleanser.    Always try to wear rubber gloves when washing dishes or cleaning.    Avoid use of    Scented/perfumed products    Irritating clothing (wool, new jeans, new/unwashed clothing, scratchy synthetics)    Neosporin or triple antibiotic topical products    Products containing aloe, herbs, Vitamin E, or other \"natural ingredients\".    Dryer sheets or fabric softeners (while symptoms are present)    If a topical medication is prescribed, apply topical prescription first, followed by use of moisturizing product.    TOPICAL STEROID INSTRUCTIONS - for use on legs  augmented betamethasone dipropionate 0.05% cream.  1. Wash hands before applying topical steroid. Use the adult fingertip unit (FTU) as a guide.    2. Apply sparingly (just enough to rub in) onto affected areas of the legs (not to exceed 1 FTU per area), two times per day for 10-14 days.  3. Then after 2 weeks, use only when needed for itchiness.  4. Wash off any excess, unused topical steroid.    This higher strength steroid should never be used on face nor " groin.    After the initial treatment, topical steroid may be used as needed for flare-ups but only for short-term treatment.    If you are using this for prolonged periods of time to control flare-ups, return to clinic for re-evaluation of treatment.    Keep in mind to also regularly use moisturizer, as this preventative measure can help maintain your skin's natural protective moisture barrier.

## 2019-03-29 ENCOUNTER — OFFICE VISIT (OUTPATIENT)
Dept: FAMILY MEDICINE | Facility: CLINIC | Age: 49
End: 2019-03-29
Payer: COMMERCIAL

## 2019-03-29 VITALS
WEIGHT: 162 LBS | OXYGEN SATURATION: 98 % | DIASTOLIC BLOOD PRESSURE: 75 MMHG | SYSTOLIC BLOOD PRESSURE: 108 MMHG | TEMPERATURE: 97.6 F | BODY MASS INDEX: 31.64 KG/M2 | RESPIRATION RATE: 10 BRPM | HEART RATE: 62 BPM

## 2019-03-29 DIAGNOSIS — K59.00 CONSTIPATION, UNSPECIFIED CONSTIPATION TYPE: Primary | ICD-10-CM

## 2019-03-29 DIAGNOSIS — K64.4 EXTERNAL HEMORRHOIDS: ICD-10-CM

## 2019-03-29 PROCEDURE — 99213 OFFICE O/P EST LOW 20 MIN: CPT | Performed by: FAMILY MEDICINE

## 2019-03-29 RX ORDER — DOCUSATE SODIUM 100 MG/1
100 CAPSULE, LIQUID FILLED ORAL 2 TIMES DAILY PRN
Qty: 60 CAPSULE | Refills: 0 | Status: SHIPPED | OUTPATIENT
Start: 2019-03-29 | End: 2021-12-15

## 2019-03-29 NOTE — PROGRESS NOTES
SUBJECTIVE:   Taya Baltazar is a 49 year old female who presents to clinic today for the following health issues:      Hemorrhoids  Onset: 5 -6 days    Description:   Pain: YES    Accompanying Signs & Symptoms:  Blood streaked toilet paper: no   Blood in stool: no   Changes in stool pattern: YES.  Constipated in the last 1 week.  Prior to that she never had that problem.    History:   Any previous GI studies done:none  Family History of colon cancer: no       Therapies Tried and outcome: none        Problem list and histories reviewed & adjusted, as indicated.  Additional history: as documented    Patient Active Problem List   Diagnosis     Vitamin D deficiency     Low TSH level     Past Surgical History:   Procedure Laterality Date     NO HISTORY OF SURGERY         Social History     Tobacco Use     Smoking status: Never Smoker     Smokeless tobacco: Never Used   Substance Use Topics     Alcohol use: No     Family History   Problem Relation Age of Onset     Unknown/Adopted Father         NOT adopted, but he  young     Asthma No family hx of      C.A.D. No family hx of      Diabetes No family hx of      Hypertension No family hx of      Breast Cancer No family hx of      Cancer - colorectal No family hx of      Anesthesia Reaction No family hx of      Blood Disease No family hx of      Eye Disorder No family hx of      Thyroid Disease No family hx of          Current Outpatient Medications   Medication Sig Dispense Refill     augmented betamethasone dipropionate (DIPROLENE-AF) 0.05 % cream Apply topically 2 times daily to AA on legs for 10-14 days. Not for use on face nor groin. 50 g 1     Cholecalciferol (VITAMIN D) 2000 UNITS tablet Take 2,000 Units by mouth daily 90 tablet 3     docusate sodium (COLACE) 100 MG capsule Take 1 capsule (100 mg) by mouth 2 times daily as needed for constipation 60 capsule 0     fluticasone (FLONASE) 50 MCG/ACT spray Spray 2 sprays into both nostrils daily 1 Bottle 3      hydrocortisone (ANUSOL-HC) 2.5 % cream Place rectally 2 times daily as needed for hemorrhoids 30 g 0     multivitamin, therapeutic with minerals (MULTI-VITAMIN) TABS tablet Take 1 tablet by mouth daily       Allergies   Allergen Reactions     Ibuprofen Sodium Nausea and Vomiting     Vicodin [Hydrocodone-Acetaminophen]        Reviewed and updated as needed this visit by clinical staff       Reviewed and updated as needed this visit by Provider         ROS:  CONSTITUTIONAL: NEGATIVE for fever, chills, change in weight  ENT/MOUTH: NEGATIVE for ear, mouth and throat problems  RESP: NEGATIVE for significant cough or SOB  CV: NEGATIVE for chest pain, palpitations or peripheral edema    OBJECTIVE:                                                    /75   Pulse 62   Temp 97.6  F (36.4  C)   Resp 10   Wt 73.5 kg (162 lb)   LMP 02/28/2019   SpO2 98%   BMI 31.64 kg/m    Body mass index is 31.64 kg/m .   GENERAL: healthy, alert, well nourished, well hydrated, no distress  HENT: ear canals- normal; TMs- normal; Nose- normal; Mouth- no ulcers, no lesions  NECK: no tenderness, no adenopathy, no asymmetry, no masses, no stiffness; thyroid- normal to palpation  RESP: lungs clear to auscultation - no rales, no rhonchi, no wheezes  CV: regular rates and rhythm, normal S1 S2, no S3 or S4 and no murmur, no click or rub -  ABDOMEN: soft, no tenderness, no  hepatosplenomegaly, no masses, normal bowel sounds  RECTAL- female: 1 nonthrombosed, tender, erythematous external hemorrhoid noted.  No active bleeding.         ASSESSMENT/PLAN:                                                        ICD-10-CM    1. Constipation, unspecified constipation type K59.00 docusate sodium (COLACE) 100 MG capsule   2. External hemorrhoids K64.4 hydrocortisone (ANUSOL-HC) 2.5 % cream     Recommended to avoid constipation.  Increase fluid and fiber intake in diet.  Stool softeners ordered.  For the external hemorrhoid hydrocortisone cream ordered to  be used twice a day as needed Until symptoms improve.  Use ibuprofen for pain control and gentle heat application discussed.  If symptoms are not improving in the next few days, instructed to notify us back.      Robert Simpson MD  [facility]

## 2019-03-30 ENCOUNTER — TRANSFERRED RECORDS (OUTPATIENT)
Dept: HEALTH INFORMATION MANAGEMENT | Facility: CLINIC | Age: 49
End: 2019-03-30

## 2019-04-04 ENCOUNTER — OFFICE VISIT (OUTPATIENT)
Dept: FAMILY MEDICINE | Facility: CLINIC | Age: 49
End: 2019-04-04
Payer: COMMERCIAL

## 2019-04-04 VITALS
DIASTOLIC BLOOD PRESSURE: 72 MMHG | WEIGHT: 161 LBS | RESPIRATION RATE: 16 BRPM | HEART RATE: 96 BPM | OXYGEN SATURATION: 97 % | SYSTOLIC BLOOD PRESSURE: 122 MMHG | TEMPERATURE: 98.4 F | BODY MASS INDEX: 31.44 KG/M2

## 2019-04-04 DIAGNOSIS — K64.4 EXTERNAL HEMORRHOIDS: Primary | ICD-10-CM

## 2019-04-04 PROCEDURE — 99213 OFFICE O/P EST LOW 20 MIN: CPT | Performed by: PHYSICIAN ASSISTANT

## 2019-04-04 NOTE — PROGRESS NOTES
SUBJECTIVE:   Taya Baltazar is a 49 year old female who presents to clinic today for the following health issues:      Hemorrhoids  Onset: 2 weeks ago    Description:   Pain: YES  Itching: no     Accompanying Signs & Symptoms:  Blood streaked toilet paper: no   Blood in stool: no   Changes in stool pattern: YES-     History:   Any previous GI studies done: none  Family History of colon cancer: no     Precipitating factors:   None    Alleviating factors:  None    Therapies Tried and outcome: medication but doesn't help    Taya was seen on 2019 for external hemorrhoids.  She has been using Anusol without improvement. She tells me that the hemorrhoids are becoming more painful over the past 2 weeks.No rectal bleeding or fevers noted.  She has been experiencing ongoing issues with constipation, recently started colace and has not yet noticed any effects of this.     Problem list and histories reviewed & adjusted, as indicated.  Additional history: as documented    Patient Active Problem List   Diagnosis     Vitamin D deficiency     Low TSH level     Past Surgical History:   Procedure Laterality Date     NO HISTORY OF SURGERY         Social History     Tobacco Use     Smoking status: Never Smoker     Smokeless tobacco: Never Used   Substance Use Topics     Alcohol use: No     Family History   Problem Relation Age of Onset     Unknown/Adopted Father         NOT adopted, but he  young     Asthma No family hx of      C.A.D. No family hx of      Diabetes No family hx of      Hypertension No family hx of      Breast Cancer No family hx of      Cancer - colorectal No family hx of      Anesthesia Reaction No family hx of      Blood Disease No family hx of      Eye Disorder No family hx of      Thyroid Disease No family hx of          Current Outpatient Medications   Medication Sig Dispense Refill     augmented betamethasone dipropionate (DIPROLENE-AF) 0.05 % cream Apply topically 2 times daily to AA on legs for  10-14 days. Not for use on face nor groin. 50 g 1     docusate sodium (COLACE) 100 MG capsule Take 1 capsule (100 mg) by mouth 2 times daily as needed for constipation 60 capsule 0     fluticasone (FLONASE) 50 MCG/ACT spray Spray 2 sprays into both nostrils daily 1 Bottle 3     hydrocortisone (ANUSOL-HC) 2.5 % cream Place rectally 2 times daily as needed for hemorrhoids 30 g 0     multivitamin, therapeutic with minerals (MULTI-VITAMIN) TABS tablet Take 1 tablet by mouth daily       Allergies   Allergen Reactions     Ibuprofen Sodium Nausea and Vomiting     Vicodin [Hydrocodone-Acetaminophen]        Reviewed and updated as needed this visit by clinical staff       Reviewed and updated as needed this visit by Provider         ROS:  Constitutional, HEENT, cardiovascular, pulmonary, gi and gu systems are negative, except as otherwise noted.    OBJECTIVE:     /72   Pulse 96   Temp 98.4  F (36.9  C) (Oral)   Resp 16   Wt 73 kg (161 lb)   SpO2 97%   BMI 31.44 kg/m    Body mass index is 31.44 kg/m .  GENERAL: healthy, alert and no distress  RECTAL (female): 1 thrombosed external hemorrhoid noted on examination   PSYCH: mentation appears normal, affect normal/bright      ASSESSMENT/PLAN:       1. External hemorrhoids  Advised that due to increased pain and evidence of thrombosed hemorrhoid on exam, that she call to schedule appointment with colorectal surgery for definitive treatment.  She is agreeable to this plan of care.   - COLORECTAL SURGERY REFERRAL    Follow-Up: 1-2 days with colorectal surgery    Norbert Gomez PA-C  [facility]

## 2019-04-04 NOTE — PATIENT INSTRUCTIONS
Appt scheduled at [facility] on Friday, April 5 at 4:00 check in at 3:40   CROW Ortiz  [address]  [place], [place]   [phone]

## 2019-04-05 ENCOUNTER — TRANSFERRED RECORDS (OUTPATIENT)
Dept: HEALTH INFORMATION MANAGEMENT | Facility: CLINIC | Age: 49
End: 2019-04-05

## 2019-11-19 ENCOUNTER — TRANSFERRED RECORDS (OUTPATIENT)
Dept: HEALTH INFORMATION MANAGEMENT | Facility: CLINIC | Age: 49
End: 2019-11-19

## 2020-02-10 ENCOUNTER — OFFICE VISIT (OUTPATIENT)
Dept: FAMILY MEDICINE | Facility: CLINIC | Age: 50
End: 2020-02-10
Payer: COMMERCIAL

## 2020-02-10 VITALS
OXYGEN SATURATION: 98 % | TEMPERATURE: 96.2 F | WEIGHT: 157.2 LBS | SYSTOLIC BLOOD PRESSURE: 108 MMHG | HEART RATE: 85 BPM | DIASTOLIC BLOOD PRESSURE: 68 MMHG | BODY MASS INDEX: 30.7 KG/M2

## 2020-02-10 DIAGNOSIS — R53.83 FATIGUE, UNSPECIFIED TYPE: Primary | ICD-10-CM

## 2020-02-10 DIAGNOSIS — G89.29 CHRONIC NONINTRACTABLE HEADACHE, UNSPECIFIED HEADACHE TYPE: ICD-10-CM

## 2020-02-10 DIAGNOSIS — R51.9 CHRONIC NONINTRACTABLE HEADACHE, UNSPECIFIED HEADACHE TYPE: ICD-10-CM

## 2020-02-10 PROCEDURE — 99213 OFFICE O/P EST LOW 20 MIN: CPT | Performed by: FAMILY MEDICINE

## 2020-02-10 NOTE — PROGRESS NOTES
Subjective     Taya Baltazar is a 49 year old female who presents to clinic today for the following health issues:    HPI   Patient comes in with a Iranian  today.  She reports that she works at 3 day/week night shift at [employer].  4 days a week she goes to school.  She tells that the 3 days she works, she is unable to sleep during the day.  She gets intense headache and excessive fatigue.  The 4 days she does not work she has to go to school but by the time she has to return to work she is recovered.  But the same thing happens again.  She has been doing this for a while.  She has talked to her manager and they want a doctor note to switch her to morning shift.  Request that for me today.  No other concerns today        Patient Active Problem List   Diagnosis     Vitamin D deficiency     Low TSH level     Past Surgical History:   Procedure Laterality Date     NO HISTORY OF SURGERY         Social History     Tobacco Use     Smoking status: Never Smoker     Smokeless tobacco: Never Used   Substance Use Topics     Alcohol use: No     Family History   Problem Relation Age of Onset     Unknown/Adopted Father         NOT adopted, but he  young     Asthma No family hx of      C.A.D. No family hx of      Diabetes No family hx of      Hypertension No family hx of      Breast Cancer No family hx of      Cancer - colorectal No family hx of      Anesthesia Reaction No family hx of      Blood Disease No family hx of      Eye Disorder No family hx of      Thyroid Disease No family hx of          Current Outpatient Medications   Medication Sig Dispense Refill     augmented betamethasone dipropionate (DIPROLENE-AF) 0.05 % cream Apply topically 2 times daily to AA on legs for 10-14 days. Not for use on face nor groin. (Patient not taking: Reported on 2/10/2020) 50 g 1     docusate sodium (COLACE) 100 MG capsule Take 1 capsule (100 mg) by mouth 2 times daily as needed for constipation (Patient not taking: Reported on  2/10/2020) 60 capsule 0     fluticasone (FLONASE) 50 MCG/ACT spray Spray 2 sprays into both nostrils daily (Patient not taking: Reported on 2/10/2020) 1 Bottle 3     hydrocortisone (ANUSOL-HC) 2.5 % cream Place rectally 2 times daily as needed for hemorrhoids (Patient not taking: Reported on 2/10/2020) 30 g 0     multivitamin, therapeutic with minerals (MULTI-VITAMIN) TABS tablet Take 1 tablet by mouth daily       Allergies   Allergen Reactions     Ibuprofen Sodium Nausea and Vomiting     Vicodin [Hydrocodone-Acetaminophen]          Reviewed and updated as needed this visit by Provider         Review of Systems   ROS COMP: CONSTITUTIONAL: NEGATIVE for fever, chills, change in weight  ENT/MOUTH: NEGATIVE for ear, mouth and throat problems  RESP: NEGATIVE for significant cough or SOB  CV: NEGATIVE for chest pain, palpitations or peripheral edema      Objective    There were no vitals taken for this visit.  There is no height or weight on file to calculate BMI.  Physical Exam   GENERAL: healthy, alert and no distress  EYES: Eyes grossly normal to inspection, PERRL and conjunctivae and sclerae normal  HENT: ear canals and TM's normal, nose and mouth without ulcers or lesions  NECK: no adenopathy, no asymmetry, masses, or scars and thyroid normal to palpation  RESP: lungs clear to auscultation - no rales, rhonchi or wheezes  CV: regular rate and rhythm, normal S1 S2, no S3 or S4, no murmur, click or rub, no peripheral edema and peripheral pulses strong  ABDOMEN: soft, nontender, no hepatosplenomegaly, no masses and bowel sounds normal            Assessment & Plan     1. Fatigue, unspecified type  2. Chronic nonintractable headache, unspecified headache type    Symptoms are likely due to inadequate sleep.  Letter written to switch her to morning shifts if possible.  Hopefully that will improve the fatigue and the headaches.  Recommending to eat well.  Stay hydrated.  If symptoms are not improving in 6 weeks, I would get  some labs done and further evaluate this.     Return in about 6 weeks (around 3/23/2020) for Annual Physical Exam.    Robert Simpson MD  [facility] [place]

## 2020-02-10 NOTE — LETTER
[facility]          [address] [place] [address]                            [phone]  Fax: [phone]    Taya Baltazar  [id number]  [place] [address]    [id number]    February 10, 2020      To whom it may concern     Taya Baltazar is under my medical care. She is getting frequent headaches and fatigue. Please allow her to work during the day time shift, instead of the night shift. I hope that it will improve her symptoms.         If you have any other questions or concerns please feel free to contact me at anytime.        Sincerely,      Calvin Jones M.D.

## 2020-04-30 ENCOUNTER — TRANSFERRED RECORDS (OUTPATIENT)
Dept: HEALTH INFORMATION MANAGEMENT | Facility: CLINIC | Age: 50
End: 2020-04-30

## 2020-08-05 ENCOUNTER — TRANSFERRED RECORDS (OUTPATIENT)
Dept: HEALTH INFORMATION MANAGEMENT | Facility: CLINIC | Age: 50
End: 2020-08-05

## 2020-08-06 ENCOUNTER — TRANSFERRED RECORDS (OUTPATIENT)
Dept: HEALTH INFORMATION MANAGEMENT | Facility: CLINIC | Age: 50
End: 2020-08-06

## 2021-07-31 ENCOUNTER — HEALTH MAINTENANCE LETTER (OUTPATIENT)
Age: 51
End: 2021-07-31

## 2021-09-25 ENCOUNTER — HEALTH MAINTENANCE LETTER (OUTPATIENT)
Age: 51
End: 2021-09-25

## 2021-12-15 ENCOUNTER — OFFICE VISIT (OUTPATIENT)
Dept: FAMILY MEDICINE | Facility: CLINIC | Age: 51
End: 2021-12-15
Payer: COMMERCIAL

## 2021-12-15 VITALS
BODY MASS INDEX: 31.61 KG/M2 | SYSTOLIC BLOOD PRESSURE: 118 MMHG | WEIGHT: 161 LBS | DIASTOLIC BLOOD PRESSURE: 70 MMHG | HEIGHT: 60 IN | OXYGEN SATURATION: 98 % | TEMPERATURE: 98.4 F | RESPIRATION RATE: 16 BRPM | HEART RATE: 76 BPM

## 2021-12-15 DIAGNOSIS — Z00.00 ROUTINE GENERAL MEDICAL EXAMINATION AT A HEALTH CARE FACILITY: Primary | ICD-10-CM

## 2021-12-15 DIAGNOSIS — Z12.31 VISIT FOR SCREENING MAMMOGRAM: ICD-10-CM

## 2021-12-15 DIAGNOSIS — E55.9 VITAMIN D DEFICIENCY: ICD-10-CM

## 2021-12-15 LAB
DEPRECATED CALCIDIOL+CALCIFEROL SERPL-MC: 33 UG/L (ref 20–75)
FASTING STATUS PATIENT QL REPORTED: YES
GLUCOSE BLD-MCNC: 101 MG/DL (ref 70–99)
HCV AB SERPL QL IA: NONREACTIVE

## 2021-12-15 PROCEDURE — 86803 HEPATITIS C AB TEST: CPT | Performed by: FAMILY MEDICINE

## 2021-12-15 PROCEDURE — 36415 COLL VENOUS BLD VENIPUNCTURE: CPT | Performed by: FAMILY MEDICINE

## 2021-12-15 PROCEDURE — 82947 ASSAY GLUCOSE BLOOD QUANT: CPT | Performed by: FAMILY MEDICINE

## 2021-12-15 PROCEDURE — 80061 LIPID PANEL: CPT | Performed by: FAMILY MEDICINE

## 2021-12-15 PROCEDURE — 82306 VITAMIN D 25 HYDROXY: CPT | Performed by: FAMILY MEDICINE

## 2021-12-15 PROCEDURE — 99396 PREV VISIT EST AGE 40-64: CPT | Performed by: FAMILY MEDICINE

## 2021-12-15 ASSESSMENT — ENCOUNTER SYMPTOMS
JOINT SWELLING: 0
FEVER: 0
CHILLS: 0
SHORTNESS OF BREATH: 0
SORE THROAT: 0
HEMATURIA: 0
ARTHRALGIAS: 0
COUGH: 0
ABDOMINAL PAIN: 0
BREAST MASS: 0
HEADACHES: 0
NERVOUS/ANXIOUS: 0
DYSURIA: 0
WEAKNESS: 0
PARESTHESIAS: 0
PALPITATIONS: 0
FREQUENCY: 0
MYALGIAS: 0
HEMATOCHEZIA: 0
CONSTIPATION: 0
EYE PAIN: 0
DIZZINESS: 0
HEARTBURN: 0
DIARRHEA: 0
NAUSEA: 0

## 2021-12-15 ASSESSMENT — PAIN SCALES - GENERAL: PAINLEVEL: NO PAIN (0)

## 2021-12-15 ASSESSMENT — MIFFLIN-ST. JEOR: SCORE: 1280.54

## 2021-12-15 NOTE — PATIENT INSTRUCTIONS
Preventive Health Recommendations  Female Ages 40 to 49    Yearly exam:     See your health care provider every year in order to  1. Review health changes.   2. Discuss preventive care.    3. Review your medicines if your doctor prescribed any.      Get a Pap test every three years (unless you have an abnormal result and your provider advises testing more often).      If you get Pap tests with HPV test, you only need to test every 5 years, unless you have an abnormal result. You do not need a Pap test if your uterus was removed (hysterectomy) and you have not had cancer.      You should be tested each year for STDs (sexually transmitted diseases), if you're at risk.     Ask your doctor if you should have a mammogram.      Have a colonoscopy (test for colon cancer) if someone in your family has had colon cancer or polyps before age 50.       Have a cholesterol test every 5 years.       Have a diabetes test (fasting glucose) after age 45. If you are at risk for diabetes, you should have this test every 3 years.    Shots: Get a flu shot each year. Get a tetanus shot every 10 years.     Nutrition:     Eat at least 5 servings of fruits and vegetables each day.    Eat whole-grain bread, whole-wheat pasta and brown rice instead of white grains and rice.    Get adequate Calcium and Vitamin D.      Lifestyle    Exercise at least 150 minutes a week (an average of 30 minutes a day, 5 days a week). This will help you control your weight and prevent disease.    Limit alcohol to one drink per day.    No smoking.     Wear sunscreen to prevent skin cancer.    See your dentist every six months for an exam and cleaning.  Patient Education     Weight Management: Fact and Fiction     Knowing the truth about losing weight can help you separate what works from what doesn t. Don t be taken in by expensive weight-loss fads such as pills, herbs, or special foods that promise unbelievable results. There s no magic way to lose weight. If  you have questions about weight loss, ask your healthcare provider.   Fiction:  The faster I lose weight, the better.    Fact: Rapid weight loss is usually because of loss of water or muscle mass. What you re trying to get rid of is extra fat. Aim to lose a 1/2 pound to 2 pounds a week. Then you re more likely to lose fat rather than water or muscle.   Fiction:  I can t start exercising until I lose weight.    Fact: The sooner you start exercising the better. Exercise helps burn more calories, tone your muscles, and keep your appetite in check. People who continue to exercise after they lose weight are more likely to keep the weight off.   Fiction:  The fewer calories I eat, the better.    Fact: This seems like it should be true, but it s not. When you eat too few calories, your body acts as if it s on a desert island. It thinks food is scarce, so it slows down how fast you burn calories (your metabolism) to save energy. By eating too few calories, you make it harder to lose weight.   Fiction:  Once I lose weight, I can go back to living the way I did before.    Fact: Going back to your old eating habits and giving up exercise is a sure way to regain any weight you ve lost. The lifestyle changes that help you lose extra weight can also help keep it off. This is why you need to make realistic changes you can stick with.   Fiction:  Low-fat and fat-free mean low-calorie.    Fact: All foods, even fat-free ones, have calories. Eat too many calories and you ll gain weight. It s OK to treat yourself to a fat-free cookie or two. Just don t eat the whole box! A dietitian will help you figure this out. Learn to read nutrition labels to see what you are really eating.   [employer] last reviewed this educational content on 11/1/2020 2000-2021 [employer]. All rights reserved. This information is not intended as a substitute for professional medical care. Always follow your healthcare professional's  instructions.           Patient Education     Weight Management: Healthy Eating  Food is your body s fuel. You can t live without it. The key is to give your body enough nutrients and energy without eating too much. Reading food labels can help you make healthy choices. Also, learn new eating habits to manage your weight. Nutrition labels are being redesigned by the FDA to emphasize the number of calories being consumed as well as the amount of more nutrients, such as added sugars, vitamin D, and potassium.     All the values on the label are based on one serving. The serving size is the average portion. Remember to multiply the values on the label by the number of servings you eat.   Eat less fat  A gram of fat has almost 2.5 times the calories of a gram of protein or carbohydrates. Try to balance your food choices so that only 20% to 35% of your calories comes from total fat. This means an average of 2  to 3  grams of fat for each 100 calories you eat.  Eat more fiber  High-fiber foods are digested more slowly than low-fiber foods, so you feel full longer. Try to get at least 25 grams of fiber each day for a 2000 calorie diet. Foods high in fiber include:  Vegetables and fruits  Whole-grain or bran breads, pastas, and cereals  Legumes (beans) and peas  As you start to eat more fiber, be sure to drink plenty of water. It will help keep your digestive system working smoothly.  Tips  Do's and don'ts include:   Eat a variety of foods, not just a few favorites.  If you find yourself eating when you re not hungry, ask yourself why. Many of us eat when we re bored, stressed, or just to be polite. Listen to your body. If you re not hungry, get busy doing something else instead of eating.  Eat slower, shooting for 20 to 30 minutes for each meal. It takes 20 minutes for your stomach to tell your brain that it s full. Slow eaters tend to eat less and are still satisfied, while fast eaters may tend to be overeaters.   Pay  attention to what you eat. Don t read or watch TV during your meal.  "[employer] last reviewed this educational content on 11/1/2020 2000-2021 [employer]. All rights reserved. This information is not intended as a substitute for professional medical care. Always follow your healthcare professional's instructions.

## 2021-12-15 NOTE — PROGRESS NOTES
SUBJECTIVE:   CC: Taya Baltazar is an 51 year old woman who presents for preventive health visit.     Patient has been advised of split billing requirements and indicates understanding: Yes  Healthy Habits:     Getting at least 3 servings of Calcium per day:  Yes    Bi-annual eye exam:  NO    Dental care twice a year:  Yes    Sleep apnea or symptoms of sleep apnea:  None    Diet:  Regular (no restrictions)    Frequency of exercise:  None    Taking medications regularly:  Yes    Barriers to taking medications:  None    Medication side effects:  None    PHQ-2 Total Score: 0    Additional concerns today:  No        Today's PHQ-2 Score:   PHQ-2 ( 1999 Pf[employer]) 12/15/2021   Q1: Little interest or pleasure in doing things 0   Q2: Feeling down, depressed or hopeless 0   PHQ-2 Score 0   PHQ-2 Total Score (12-17 Years)- Positive if 3 or more points; Administer PHQ-A if positive -   Q1: Little interest or pleasure in doing things Not at all   Q2: Feeling down, depressed or hopeless Not at all   PHQ-2 Score 0       Abuse: Current or Past (Physical, Sexual or Emotional) - No  Do you feel safe in your environment? Yes    Have you ever done Advance Care Planning? (For example, a Health Directive, POLST, or a discussion with a medical provider or your loved ones about your wishes): No, advance care planning information given to patient to review.  Advanced care planning was discussed at today's visit.    Social History     Tobacco Use     Smoking status: Never Smoker     Smokeless tobacco: Never Used   Substance Use Topics     Alcohol use: No     If you drink alcohol do you typically have >3 drinks per day or >7 drinks per week? No    Alcohol Use 12/15/2021   Prescreen: >3 drinks/day or >7 drinks/week? No   Prescreen: >3 drinks/day or >7 drinks/week? -   No flowsheet data found.      Patient Active Problem List   Diagnosis     Vitamin D deficiency     Low TSH level     Past Surgical History:   Procedure Laterality Date     NO  HISTORY OF SURGERY         Social History     Tobacco Use     Smoking status: Never Smoker     Smokeless tobacco: Never Used   Substance Use Topics     Alcohol use: No     Family History   Problem Relation Age of Onset     Unknown/Adopted Father         NOT adopted, but he  young     Asthma No family hx of      C.A.D. No family hx of      Diabetes No family hx of      Hypertension No family hx of      Breast Cancer No family hx of      Cancer - colorectal No family hx of      Anesthesia Reaction No family hx of      Blood Disease No family hx of      Eye Disorder No family hx of      Thyroid Disease No family hx of            Breast Cancer Screening:  Any new diagnosis of family breast, ovarian, or bowel cancer? No    FHS-7: No flowsheet data found.    Mammogram Screening - Offered annual screening and updated Health Maintenance for [place] plan based on risk factor consideration    Pertinent mammograms are reviewed under the imaging tab.    History of abnormal Pap smear: NO - age 30- 65 PAP every 3 years recommended  PAP / HPV Latest Ref Rng & Units 2018   PAP (Historical) - NIL   HPV16 NEG:Negative Negative   HPV18 NEG:Negative Negative   HRHPV NEG:Negative Negative     Reviewed and updated as needed this visit by clinical staff  Tobacco  Allergies  Meds             Reviewed and updated as needed this visit by Provider               Past Medical History:   Diagnosis Date     Active labor 10/4/2012     Aphthous ulcer 10/15/2014     Carpal tunnel syndrome 10/15/2014    Left       Chronic low back pain 2013     Supervision of other normal pregnancy      - vaginal     Trigger finger 2013     Vitamin D deficiency 2011        Review of Systems  CONSTITUTIONAL: NEGATIVE for fever, chills, change in weight  INTEGUMENTARU/SKIN: NEGATIVE for worrisome rashes, moles or lesions  EYES: NEGATIVE for vision changes or irritation  ENT: NEGATIVE for ear, mouth and throat problems  RESP: NEGATIVE for  "significant cough or SOB  BREAST: NEGATIVE for masses, tenderness or discharge  CV: NEGATIVE for chest pain, palpitations or peripheral edema  GI: NEGATIVE for nausea, abdominal pain, heartburn, or change in bowel habits  : NEGATIVE for unusual urinary or vaginal symptoms.    female: normal menses, no unusual urinary symptoms and no unusual vaginal symptoms  MUSCULOSKELETAL: NEGATIVE for significant arthralgias or myalgia  NEURO: NEGATIVE for weakness, dizziness or paresthesias  ENDOCRINE: NEGATIVE for temperature intolerance, skin/hair changes  HEME/ALLERGY/IMMUNE: NEGATIVE for bleeding problems  PSYCHIATRIC: NEGATIVE for changes in mood or affect     OBJECTIVE:   /70   Pulse 76   Temp 98.4  F (36.9  C) (Tympanic)   Resp 16   Ht 1.53 m (5' 0.24\")   Wt 73 kg (161 lb)   SpO2 98%   BMI 31.20 kg/m    Physical Exam  GENERAL: healthy, alert and no distress  EYES: Eyes grossly normal to inspection, PERRL and conjunctivae and sclerae normal  HENT: ear canals and TM's normal, nose and mouth without ulcers or lesions  NECK: no adenopathy, no asymmetry, masses, or scars and thyroid normal to palpation  RESP: lungs clear to auscultation - no rales, rhonchi or wheezes  BREAST: normal without masses, tenderness or nipple discharge and no palpable axillary masses or adenopathy  CV: regular rate and rhythm, normal S1 S2, no S3 or S4, no murmur,  no peripheral edema   ABDOMEN: soft, nontender, no hepatosplenomegaly, no masses and bowel sounds normal   (female): deferred per pt   MS: no gross musculoskeletal defects noted, no edema  SKIN: no suspicious lesions or rashes  NEURO: Normal strength and tone, mentation intact and speech normal  PSYCH: mentation appears normal, affect normal/bright        ASSESSMENT/PLAN:   (Z00.00) Routine general medical examination at a health care facility  (primary encounter diagnosis)  Comment:   Plan: GLUCOSE, Lipid panel reflex to direct LDL         Fasting, Hepatitis C Screen " "Reflex to HCV RNA         Quant and Genotype            (E55.9) Vitamin D deficiency  Comment:   Plan: Vitamin D Deficiency            (Z12.31) Visit for screening mammogram  Comment:   Plan: *MA Screening Digital Bilateral            (Z68.31) BMI 31.0-31.9,adult  Comment:   Plan: Healthy diet and exercise reviewed.  Risks of obesity discussed.  Encourage exercise.        Healthy diet and exercise reviewed. Risks of obesity discussed.  Encourage exercise.     Check labs.Cares and  treatment discussed follow. up if problem   Patient expressed understanding and agreement with treatment plan. All patient's questions were answered, will let me know if has more later.  Medications: Rx's: Reviewed the potential side effects/complications of medications prescribed.     COUNSELING:  Reviewed preventive health counseling, as reflected in patient instructions       Regular exercise       Healthy diet/nutrition       Vision screening       Hearing screening       Immunizations    Declined: covid booster  Will consider later            Osteoporosis prevention/bone health       Colon cancer screening    Estimated body mass index is 31.2 kg/m  as calculated from the following:    Height as of this encounter: 1.53 m (5' 0.24\").    Weight as of this encounter: 73 kg (161 lb).    Weight management plan: Discussed healthy diet and exercise guidelines    She reports that she has never smoked. She has never used smokeless tobacco.      Counseling Resources:  ATP IV Guidelines  Pooled Cohorts Equation Calculator  Breast Cancer Risk Calculator  BRCA-Related Cancer Risk Assessment: FHS-7 Tool  FRAX Risk Assessment  ICSI Preventive Guidelines  Dietary Guidelines for Americans, 2010  USDA's MyPlate  ASA Prophylaxis  Lung CA Screening    Sarah León MD  [facility]  "

## 2021-12-17 LAB
CHOLEST SERPL-MCNC: 165 MG/DL
FASTING STATUS PATIENT QL REPORTED: YES
HDLC SERPL-MCNC: 51 MG/DL
LDLC SERPL CALC-MCNC: 94 MG/DL
NONHDLC SERPL-MCNC: 114 MG/DL
TRIGL SERPL-MCNC: 98 MG/DL

## 2022-05-10 ENCOUNTER — TELEPHONE (OUTPATIENT)
Dept: FAMILY MEDICINE | Facility: CLINIC | Age: 52
End: 2022-05-10

## 2022-05-10 NOTE — TELEPHONE ENCOUNTER
Patient Quality Outreach    Patient is due for the following:   Colon Cancer Screening -  Colonoscopy  Breast Cancer Screening - Mammogram  Cervical Cancer Screening - PAP Needed    NEXT STEPS:   Schedule exams    Type of outreach:    Sent [employer] message.      Questions for provider review:    None     Cele Lares, [facility]

## 2022-08-27 ENCOUNTER — HEALTH MAINTENANCE LETTER (OUTPATIENT)
Age: 52
End: 2022-08-27

## 2023-04-22 ENCOUNTER — HEALTH MAINTENANCE LETTER (OUTPATIENT)
Age: 53
End: 2023-04-22

## 2023-09-23 ENCOUNTER — HEALTH MAINTENANCE LETTER (OUTPATIENT)
Age: 53
End: 2023-09-23

## 2024-06-29 ENCOUNTER — HEALTH MAINTENANCE LETTER (OUTPATIENT)
Age: 54
End: 2024-06-29